# Patient Record
Sex: FEMALE | Race: BLACK OR AFRICAN AMERICAN | NOT HISPANIC OR LATINO
[De-identification: names, ages, dates, MRNs, and addresses within clinical notes are randomized per-mention and may not be internally consistent; named-entity substitution may affect disease eponyms.]

---

## 2017-06-20 ENCOUNTER — TRANSCRIPTION ENCOUNTER (OUTPATIENT)
Age: 37
End: 2017-06-20

## 2017-10-04 ENCOUNTER — RESULT REVIEW (OUTPATIENT)
Age: 37
End: 2017-10-04

## 2017-10-10 ENCOUNTER — TRANSCRIPTION ENCOUNTER (OUTPATIENT)
Age: 37
End: 2017-10-10

## 2018-01-20 ENCOUNTER — TRANSCRIPTION ENCOUNTER (OUTPATIENT)
Age: 38
End: 2018-01-20

## 2018-05-12 ENCOUNTER — TRANSCRIPTION ENCOUNTER (OUTPATIENT)
Age: 38
End: 2018-05-12

## 2018-07-31 ENCOUNTER — RESULT REVIEW (OUTPATIENT)
Age: 38
End: 2018-07-31

## 2019-03-14 ENCOUNTER — TRANSCRIPTION ENCOUNTER (OUTPATIENT)
Age: 39
End: 2019-03-14

## 2019-05-21 ENCOUNTER — RESULT REVIEW (OUTPATIENT)
Age: 39
End: 2019-05-21

## 2019-06-07 ENCOUNTER — RESULT REVIEW (OUTPATIENT)
Age: 39
End: 2019-06-07

## 2019-08-23 ENCOUNTER — TRANSCRIPTION ENCOUNTER (OUTPATIENT)
Age: 39
End: 2019-08-23

## 2019-10-08 ENCOUNTER — TRANSCRIPTION ENCOUNTER (OUTPATIENT)
Age: 39
End: 2019-10-08

## 2020-05-13 ENCOUNTER — TRANSCRIPTION ENCOUNTER (OUTPATIENT)
Age: 40
End: 2020-05-13

## 2020-06-01 ENCOUNTER — TRANSCRIPTION ENCOUNTER (OUTPATIENT)
Age: 40
End: 2020-06-01

## 2020-10-19 ENCOUNTER — TRANSCRIPTION ENCOUNTER (OUTPATIENT)
Age: 40
End: 2020-10-19

## 2021-05-21 ENCOUNTER — TRANSCRIPTION ENCOUNTER (OUTPATIENT)
Age: 41
End: 2021-05-21

## 2021-06-23 ENCOUNTER — TRANSCRIPTION ENCOUNTER (OUTPATIENT)
Age: 41
End: 2021-06-23

## 2021-09-20 ENCOUNTER — TRANSCRIPTION ENCOUNTER (OUTPATIENT)
Age: 41
End: 2021-09-20

## 2021-09-20 DIAGNOSIS — Z01.812 ENCOUNTER FOR PREPROCEDURAL LABORATORY EXAMINATION: ICD-10-CM

## 2021-09-30 ENCOUNTER — APPOINTMENT (OUTPATIENT)
Dept: PULMONOLOGY | Facility: CLINIC | Age: 41
End: 2021-09-30

## 2021-11-10 ENCOUNTER — TRANSCRIPTION ENCOUNTER (OUTPATIENT)
Age: 41
End: 2021-11-10

## 2021-11-12 ENCOUNTER — TRANSCRIPTION ENCOUNTER (OUTPATIENT)
Age: 41
End: 2021-11-12

## 2021-11-13 ENCOUNTER — TRANSCRIPTION ENCOUNTER (OUTPATIENT)
Age: 41
End: 2021-11-13

## 2022-05-04 ENCOUNTER — APPOINTMENT (OUTPATIENT)
Dept: ORTHOPEDIC SURGERY | Facility: CLINIC | Age: 42
End: 2022-05-04

## 2022-05-16 ENCOUNTER — NON-APPOINTMENT (OUTPATIENT)
Age: 42
End: 2022-05-16

## 2022-05-21 ENCOUNTER — NON-APPOINTMENT (OUTPATIENT)
Age: 42
End: 2022-05-21

## 2022-07-05 ENCOUNTER — APPOINTMENT (OUTPATIENT)
Dept: ORTHOPEDIC SURGERY | Facility: CLINIC | Age: 42
End: 2022-07-05

## 2022-08-03 ENCOUNTER — APPOINTMENT (OUTPATIENT)
Dept: ORTHOPEDIC SURGERY | Facility: CLINIC | Age: 42
End: 2022-08-03

## 2022-08-03 VITALS — BODY MASS INDEX: 26.31 KG/M2 | HEIGHT: 62 IN | WEIGHT: 143 LBS

## 2022-08-03 PROCEDURE — 99213 OFFICE O/P EST LOW 20 MIN: CPT

## 2022-08-03 PROCEDURE — 99072 ADDL SUPL MATRL&STAF TM PHE: CPT

## 2022-08-03 NOTE — ASSESSMENT
[FreeTextEntry1] : xrays of the left knee from 8/26/21\par Obtain knee\par OOW 1wk\par \par Impression: \par 1. Slight effusion, mild infrapatellar synovitis, slight prepatellar bursitis and tiny popliteal cyst.\par 2. Tiny enchondroma in the lateral femoral condyle.\par 3. No acute osseous injury, ligament tear, meniscal tear, chondral defect or loose body.\par 4. Mild distal quadriceps tendinopathy without tear.\par E-signed by Vito Monk MD 09/13/2021\par Encouraged HEP and PT to improve mechanics and reduce pain. \par OOW until 9/27/21\par 2/9/22: Recommend restarting in PT, Mobic rx. OOW x 1 week. Follow up 4 weeks.\par \par 8/3/22: Left knee and leg pain flareup with calf pain about a week ago. Send for stat doppler LLE to r/o DVT. If doppler negative, will start PT. Mobic. fu 4 weeks

## 2022-08-03 NOTE — PHYSICAL EXAM
[Left] : left knee [NL (0)] : extension 0 degrees [] : no effusion [TWNoteComboBox7] : flexion 130 degrees

## 2022-08-03 NOTE — DISCUSSION/SUMMARY
[de-identified] : The documentation recorded by the scribe accurately reflects the service I personally performed and the decisions made by me.\par I, Miguel Ángel Patton, attest that this documentation has been prepared under the direction and in the presence of Provider Jeff Osborn MD.\par \par The patient was seen by Jeff Osborn MD\par

## 2022-08-03 NOTE — HISTORY OF PRESENT ILLNESS
[8] : 8 [Full time] : Work status: full time [] : yes [de-identified] : WC DOI 8/25/21\par 2/9/22: Here for fu. Pain has returned. She has been working. \par 9/17/21: PT present to review MRI.\par 8/3/22: Left knee and lower leg pain worsening over the last month. She has been working 16 hr shifts 4 days per week which she think flared up her pain. She is using the knee brace. Meloxicam PRN  [FreeTextEntry1] : LEFT KNEE

## 2022-08-03 NOTE — WORK
[Was the competent medical cause of the injury] : was the competent medical cause of the injury [Are consistent with the injury] : are consistent with the injury [Consistent with my objective findings] : consistent with my objective findings [Total] : total [Does not reveal pre-existing condition(s) that may affect treatment/prognosis] : does not reveal pre-existing condition(s) that may affect treatment/prognosis [Cannot return to work because ________] : cannot return to work because [unfilled] [No Rx restrictions] : No Rx restrictions. [I provided the services listed above] :  I provided the services listed above. [FreeTextEntry1] : guarded

## 2022-09-07 ENCOUNTER — APPOINTMENT (OUTPATIENT)
Dept: ORTHOPEDIC SURGERY | Facility: CLINIC | Age: 42
End: 2022-09-07

## 2022-09-07 VITALS — BODY MASS INDEX: 26.31 KG/M2 | WEIGHT: 143 LBS | HEIGHT: 62 IN

## 2022-09-07 PROCEDURE — 99213 OFFICE O/P EST LOW 20 MIN: CPT

## 2022-09-07 PROCEDURE — 99072 ADDL SUPL MATRL&STAF TM PHE: CPT

## 2022-09-07 NOTE — DATA REVIEWED
[Venous Doppler] : A Venous Doppler test was completed of the [Lower extremity] : lower extremity [Negative] : negative [FreeTextEntry1] : \par Impression:  left knee\par 1. Slight effusion, mild infrapatellar synovitis, slight prepatellar bursitis and tiny popliteal cyst.\par 2. Tiny enchondroma in the lateral femoral condyle.\par 3. No acute osseous injury, ligament tear, meniscal tear, chondral defect or loose body.\par 4. Mild distal quadriceps tendinopathy without tear.\par E-signed by Vito Monk MD 09/13/2021

## 2022-09-07 NOTE — PHYSICAL EXAM
[Left] : left knee [NL (0)] : extension 0 degrees [] : patient ambulates with assistive device [de-identified] : quad 5-/5 [TWNoteComboBox7] : flexion 130 degrees

## 2022-09-07 NOTE — ASSESSMENT
[FreeTextEntry1] : outside xrays of the left knee UC from 8/26/21 reveals no fracture, or abnormalities. \par \par OOW 1wk\par \par Encouraged HEP and PT to improve mechanics and reduce pain. \par OOW until 9/27/21\par 2/9/22: Recommend restarting in PT, Mobic rx. OOW x 1 week. Follow up 4 weeks.\par \par 8/3/22: Left knee and leg pain flareup with calf pain about a week ago. Send for stat doppler LLE to r/o DVT. If doppler negative, will start PT. Mobic. fu 4 weeks\par \par 9/7/22: anticipating she will be able to return to work in 2 wks, if not she will contact me for options. \par Disability paperwork handed to office staff today. \par Apply ice to affected area.\par resume PT and HEP to improve mechanics and reduce pain. Has been beneficial for the patient. \par Questions addressed.\par \par \par

## 2022-09-07 NOTE — HISTORY OF PRESENT ILLNESS
[8] : 8 [Not working due to injury] : Work status: not working due to injury [] : yes [de-identified] :  DOI 8/25/21\par 9/7/22: f/u on the left knee and calf, had doppler that returned negative. Is attending PT. Still some difficulty with tasks. \par \par 2/9/22: Here for fu. Pain has returned. She has been working. \par \par 9/17/21: PT present to review MRI.\par \par 8/3/22: Left knee and lower leg pain worsening over the last month. She has been working 16 hr shifts 4 days per week which she think flared up her pain. She is using the knee brace. Meloxicam PRN  [FreeTextEntry1] : left knee [de-identified] : PT

## 2022-09-07 NOTE — DISCUSSION/SUMMARY
[de-identified] : The documentation recorded by the scribe accurately reflects the service I personally performed and the decisions made by me.\par I, Miguel Ángel Patton, attest that this documentation has been prepared under the direction and in the presence of Provider Jeff Osborn MD.\par \par The patient was seen by Jeff Osborn MD\par

## 2022-09-21 ENCOUNTER — APPOINTMENT (OUTPATIENT)
Dept: ORTHOPEDIC SURGERY | Facility: CLINIC | Age: 42
End: 2022-09-21

## 2022-09-21 PROCEDURE — 99213 OFFICE O/P EST LOW 20 MIN: CPT

## 2022-09-21 PROCEDURE — 99072 ADDL SUPL MATRL&STAF TM PHE: CPT

## 2022-09-21 NOTE — PHYSICAL EXAM
[Left] : left knee [NL (0)] : extension 0 degrees [] : patient ambulates with assistive device [de-identified] : quad 5-/5 [TWNoteComboBox7] : flexion 130 degrees

## 2022-09-21 NOTE — DISCUSSION/SUMMARY
[de-identified] : The documentation recorded by the scribe accurately reflects the service I personally performed and the decisions made by me.\par I, Miguel Ángel Patton, attest that this documentation has been prepared under the direction and in the presence of Provider Jeff Osborn MD.\par The patient was seen by Dr. Osborn\par

## 2022-09-21 NOTE — ASSESSMENT
[FreeTextEntry1] : outside xrays of the left knee UC from 8/26/21 reveals no fracture, or abnormalities. \par \par OOW 1wk\par \par Encouraged HEP and PT to improve mechanics and reduce pain. \par OOW until 9/27/21\par 2/9/22: Recommend restarting in PT, Mobic rx. OOW x 1 week. Follow up 4 weeks.\par \par 8/3/22: Left knee and leg pain flareup with calf pain about a week ago. Send for stat doppler LLE to r/o DVT. If doppler negative, will start PT. Mobic. fu 4 weeks\par \par 9/7/22: anticipating she will be able to return to work in 2 wks, if not she will contact me for options. \par Disability paperwork handed to office staff today. \par Apply ice to affected area.\par resume PT and HEP to improve mechanics and reduce pain. Has been beneficial for the patient. \par Questions addressed.\par \par 09/21/2022: Rtw full duty in 2 wks. resume PT and HEp as needed,\par Questions answered.\par Apply ice to affected area.\par \par \par \par  Stage 1: Number Of Blocks?: 1

## 2022-10-06 ENCOUNTER — APPOINTMENT (OUTPATIENT)
Dept: ORTHOPEDIC SURGERY | Facility: CLINIC | Age: 42
End: 2022-10-06

## 2022-10-06 VITALS — WEIGHT: 143 LBS | HEIGHT: 62 IN | BODY MASS INDEX: 26.31 KG/M2

## 2022-10-06 PROCEDURE — 99072 ADDL SUPL MATRL&STAF TM PHE: CPT

## 2022-10-06 PROCEDURE — 99214 OFFICE O/P EST MOD 30 MIN: CPT

## 2022-10-06 NOTE — ASSESSMENT
[FreeTextEntry1] : \par outside xrays of the left knee UC from 8/26/21 reveals no fracture, or abnormalities. \par \par OOW 1wk\par \par Encouraged HEP and PT to improve mechanics and reduce pain. \par OOW until 9/27/21\par 2/9/22: Recommend restarting in PT, Mobic rx. OOW x 1 week. Follow up 4 weeks.\par \par 8/3/22: Left knee and leg pain flareup with calf pain about a week ago. Send for stat doppler LLE to r/o DVT. If doppler negative, will start PT. Mobic. fu 4 weeks\par \par 9/7/22: anticipating she will be able to return to work in 2 wks, if not she will contact me for options. \par Disability paperwork handed to office staff today. \par Apply ice to affected area.\par resume PT and HEP to improve mechanics and reduce pain. Has been beneficial for the patient. \par Questions addressed.\par \par 09/21/2022: Rtw full duty in 2 wks. resume PT and HEp as needed\par \par 10/06/2022 Buckling episode of the left knee on 10/4/22. \par Work status updated. \par Questions addressed.\par Apply ice to affected area.\par Activity modifier as tolerated.\par \par

## 2022-10-06 NOTE — PHYSICAL EXAM
[Left] : left knee [Equivocal] : equivocal Karena [] : patient ambulates with assistive device [TWNoteComboBox7] : flexion 130 degrees [de-identified] : extension 3 degrees

## 2022-10-06 NOTE — HISTORY OF PRESENT ILLNESS
[7] : 7 [Dull/Aching] : dull/aching [Constant] : constant [Meds] : meds [de-identified] : 10/06/2022 Here for left knee buckling episode on 10/4/22. There can be night symptoms. Using HKB. Taking NSAIDs.  [] : Post Surgical Visit: no [FreeTextEntry1] : left knee [FreeTextEntry5] : Patient states her knee gave out on her in 10/4/22 [FreeTextEntry6] : stiffness

## 2022-10-06 NOTE — DISCUSSION/SUMMARY
[de-identified] : The documentation recorded by the scribe accurately reflects the service I personally performed and the decisions made by me.\par I, Miguel Ángel Patton, attest that this documentation has been prepared under the direction and in the presence of Provider Jeff Osborn MD.\par \par The patient was seen by Jeff Osborn MD.\par

## 2022-10-16 ENCOUNTER — FORM ENCOUNTER (OUTPATIENT)
Age: 42
End: 2022-10-16

## 2022-10-17 ENCOUNTER — APPOINTMENT (OUTPATIENT)
Dept: MRI IMAGING | Facility: CLINIC | Age: 42
End: 2022-10-17

## 2022-10-17 PROCEDURE — 99072 ADDL SUPL MATRL&STAF TM PHE: CPT

## 2022-10-17 PROCEDURE — 73721 MRI JNT OF LWR EXTRE W/O DYE: CPT | Mod: LT

## 2022-10-18 ENCOUNTER — TRANSCRIPTION ENCOUNTER (OUTPATIENT)
Age: 42
End: 2022-10-18

## 2022-10-20 ENCOUNTER — APPOINTMENT (OUTPATIENT)
Dept: ORTHOPEDIC SURGERY | Facility: CLINIC | Age: 42
End: 2022-10-20

## 2022-10-20 ENCOUNTER — NON-APPOINTMENT (OUTPATIENT)
Age: 42
End: 2022-10-20

## 2022-10-20 VITALS — BODY MASS INDEX: 26.31 KG/M2 | WEIGHT: 143 LBS | HEIGHT: 62 IN

## 2022-10-20 PROCEDURE — 99072 ADDL SUPL MATRL&STAF TM PHE: CPT

## 2022-10-20 PROCEDURE — 99213 OFFICE O/P EST LOW 20 MIN: CPT

## 2022-10-20 NOTE — DISCUSSION/SUMMARY
[de-identified] : The documentation recorded by the scribe accurately reflects the service I personally performed and the decisions made by me.\par I, Miguel Ángel Patotn, attest that this documentation has been prepared under the direction and in the presence of Provider Jeff Osborn MD.\par \par The patient was seen by Jeff Osborn MD.\par

## 2022-10-20 NOTE — PHYSICAL EXAM
[Left] : left knee [Equivocal] : equivocal Karena [] : patient ambulates with assistive device [TWNoteComboBox7] : flexion 130 degrees [de-identified] : extension 3 degrees

## 2022-10-20 NOTE — HISTORY OF PRESENT ILLNESS
[7] : 7 [Dull/Aching] : dull/aching [de-identified] : 10/06/2022 Here for left knee buckling episode on 10/4/22. There can be night symptoms. Using HKB. Taking NSAIDs.  [] : Post Surgical Visit: no [FreeTextEntry1] : left knee  [de-identified] : none

## 2022-10-20 NOTE — DATA REVIEWED
[MRI] : MRI [Left] : left [Knee] : knee [Report was reviewed and noted in the chart] : The report was reviewed and noted in the chart [I reviewed the films/CD and agree] : I reviewed the films/CD and agree [FreeTextEntry1] : Impression: left knee\par 1. No significant interval change from prior exam, which includes slight effusion, infrapatellar synovitis, slight prepatellar bursitis and tiny popliteal cyst.\par 2. Mild quadriceps tendinopathy.\par 3. Tiny enchondroma in the superior anterior peripheral lateral femoral condyle.\par 4. No acute osseous injury, meniscal tear or loose body.\par E-signed by Vito Monk MD 10/18/2022 10:15:47 AM

## 2022-10-20 NOTE — ASSESSMENT
[FreeTextEntry1] : \par outside xrays of the left knee UC from 8/26/21 reveals no fracture, or abnormalities. \par \par OOW 1wk\par \par Encouraged HEP and PT to improve mechanics and reduce pain. \par OOW until 9/27/21\par 2/9/22: Recommend restarting in PT, Mobic rx. OOW x 1 week. Follow up 4 weeks.\par \par 8/3/22: Left knee and leg pain flareup with calf pain about a week ago. Send for stat doppler LLE to r/o DVT. If doppler negative, will start PT. Mobic. fu 4 weeks\par \par 9/7/22: anticipating she will be able to return to work in 2 wks, if not she will contact me for options. \par Disability paperwork handed to office staff today. \par Apply ice to affected area.\par resume PT and HEP to improve mechanics and reduce pain. Has been beneficial for the patient. \par Questions addressed.\par \par 09/21/2022: Rtw full duty in 2 wks. resume PT and HEp as needed\par \par 10/06/2022 Buckling episode of the left knee on 10/4/22. \par Work status updated. \par \par 10/20/2022: MRI reviewed and discussed.\par OOW 2 wks. Start PT and HEP to improve mechanics and reduce pain.\par Questions addressed.\par Apply ice to affected area.\par Activity modifier as tolerated.\par \par

## 2022-11-01 ENCOUNTER — NON-APPOINTMENT (OUTPATIENT)
Age: 42
End: 2022-11-01

## 2022-11-03 ENCOUNTER — NON-APPOINTMENT (OUTPATIENT)
Age: 42
End: 2022-11-03

## 2022-11-03 ENCOUNTER — APPOINTMENT (OUTPATIENT)
Dept: ORTHOPEDIC SURGERY | Facility: CLINIC | Age: 42
End: 2022-11-03

## 2022-11-03 PROCEDURE — 99214 OFFICE O/P EST MOD 30 MIN: CPT

## 2022-11-03 PROCEDURE — 99072 ADDL SUPL MATRL&STAF TM PHE: CPT

## 2022-11-03 RX ORDER — METHYLPREDNISOLONE 4 MG/1
4 TABLET ORAL
Qty: 1 | Refills: 0 | Status: COMPLETED | COMMUNITY
Start: 2022-11-03 | End: 2022-11-09

## 2022-11-03 NOTE — PHYSICAL EXAM
[Left] : left knee [Equivocal] : equivocal Karena [] : patient ambulates with assistive device [TWNoteComboBox7] : flexion 130 degrees [de-identified] : extension 3 degrees

## 2022-11-03 NOTE — HISTORY OF PRESENT ILLNESS
[7] : 7 [Dull/Aching] : dull/aching [de-identified] :  DOI 8/25/21\par 9/21/22: f/u left knee. \par \par 9/7/22: f/u on the left knee and calf, had doppler that returned negative. Is attending PT. Still some difficulty with tasks. \par \par 8/3/22: Left knee and lower leg pain worsening over the last month. She has been working 16 hr shifts 4 days per week which she think flared up her pain. She is using the knee brace. Meloxicam PRN \par \par 2/9/22: Here for fu. Pain has returned. She has been working. \par \par 9/17/21: PT present to review MRI.\par \par  [] : Post Surgical Visit: no [FreeTextEntry1] : left knee  [de-identified] : PT

## 2022-11-03 NOTE — WORK
[Was the competent medical cause of the injury] : was the competent medical cause of the injury [Are consistent with the injury] : are consistent with the injury [Consistent with my objective findings] : consistent with my objective findings [Total] : total [Does not reveal pre-existing condition(s) that may affect treatment/prognosis] : does not reveal pre-existing condition(s) that may affect treatment/prognosis [Cannot return to work because ________] : cannot return to work because [unfilled] [No Rx restrictions] : No Rx restrictions. [I provided the services listed above] :  I provided the services listed above. [FreeTextEntry1] : guarded OOW until 12/1/22

## 2022-11-03 NOTE — ASSESSMENT
[FreeTextEntry1] : outside xrays of the left knee UC from 8/26/21 reveals no fracture, or abnormalities. \par \par OOW 1wk\par \par Encouraged HEP and PT to improve mechanics and reduce pain. \par OOW until 9/27/21\par 2/9/22: Recommend restarting in PT, Mobic rx. OOW x 1 week. Follow up 4 weeks.\par \par 8/3/22: Left knee and leg pain flareup with calf pain about a week ago. Send for stat doppler LLE to r/o DVT. If doppler negative, will start PT. Mobic. fu 4 weeks\par \par 9/7/22: anticipating she will be able to return to work in 2 wks, if not she will contact me for options. \par Disability paperwork handed to office staff today. \par Apply ice to affected area.\par resume PT and HEP to improve mechanics and reduce pain. Has been beneficial for the patient. \par Questions addressed.\par \par 09/21/2022: Rtw full duty in 2 wks. resume PT and HEp as needed,\par Questions answered.\par Apply ice to affected area.\par \par 11/03/2022: Prescribed mdp to help alveated pain.  confrims medial tenderness. \par resume pt Renewed. \par OOW until 12/1/22\par Questions addressed.\par Activity modifier as tolerated.\par \par \par \par

## 2022-11-03 NOTE — DISCUSSION/SUMMARY
[de-identified] : The documentation recorded by the scribe accurately reflects the service I personally performed and the decisions made by me.\par I, Miguel Ángel Patton, attest that this documentation has been prepared under the direction and in the presence of Provider Jeff Osborn MD.\par \par The patient was seen by Jeff Osborn MD.\par The following radiographs were ordered and read by me during this patient's visit. I reviewed each radiograph in detail with the patient, and discussed the findings as highlighted below.\par

## 2022-12-01 ENCOUNTER — APPOINTMENT (OUTPATIENT)
Dept: ORTHOPEDIC SURGERY | Facility: CLINIC | Age: 42
End: 2022-12-01

## 2022-12-01 VITALS — WEIGHT: 143 LBS | HEIGHT: 62 IN | BODY MASS INDEX: 26.31 KG/M2

## 2022-12-01 PROCEDURE — 20610 DRAIN/INJ JOINT/BURSA W/O US: CPT | Mod: LT

## 2022-12-01 PROCEDURE — 99213 OFFICE O/P EST LOW 20 MIN: CPT | Mod: 25

## 2022-12-01 PROCEDURE — 99072 ADDL SUPL MATRL&STAF TM PHE: CPT

## 2022-12-01 NOTE — ASSESSMENT
[FreeTextEntry1] : outside xrays of the left knee UC from 8/26/21 reveals no fracture, or abnormalities. \par \par OOW 1wk\par \par Encouraged HEP and PT to improve mechanics and reduce pain. \par OOW until 9/27/21\par 2/9/22: Recommend restarting in PT, Mobic rx. OOW x 1 week. Follow up 4 weeks.\par \par 8/3/22: Left knee and leg pain flareup with calf pain about a week ago. Send for stat doppler LLE to r/o DVT. If doppler negative, will start PT. Mobic. fu 4 weeks\par \par 9/7/22: anticipating she will be able to return to work in 2 wks, if not she will contact me for options. \par Disability paperwork handed to office staff today. \par Apply ice to affected area.\par resume PT and HEP to improve mechanics and reduce pain. Has been beneficial for the patient. \par Questions addressed.\par \par 09/21/2022: Rtw full duty in 2 wks. resume PT and HEp as needed,\par Questions answered.\par Apply ice to affected area.\par \par 11/03/2022: Prescribed mdp to help alveated pain.  confrims medial tenderness. \par resume pt Renewed. \par OOW until 12/1/22\par \par 12/1/22: CSI offered today - tolerated well. \par PT renewed\par OOW until 12/15/22\par Questions addressed.\par Activity modifier as tolerated.\par \par \par \par

## 2022-12-01 NOTE — REASON FOR VISIT
[FreeTextEntry2] : Patient is here for a Worker's Comp Follow Up appointment for the Left Knee. DOI: 8/25/21

## 2022-12-01 NOTE — PHYSICAL EXAM
[Left] : left knee [4___] : hamstring 4[unfilled]/5 [Equivocal] : equivocal Karena [] : patient ambulates with assistive device [TWNoteComboBox7] : flexion 130 degrees [de-identified] : extension 0 degrees

## 2022-12-01 NOTE — WORK
[Was the competent medical cause of the injury] : was the competent medical cause of the injury [Are consistent with the injury] : are consistent with the injury [Consistent with my objective findings] : consistent with my objective findings [Total] : total [Does not reveal pre-existing condition(s) that may affect treatment/prognosis] : does not reveal pre-existing condition(s) that may affect treatment/prognosis [Cannot return to work because ________] : cannot return to work because [unfilled] [No Rx restrictions] : No Rx restrictions. [I provided the services listed above] :  I provided the services listed above. [FreeTextEntry1] : guarded OOW until 12/15/22

## 2022-12-01 NOTE — HISTORY OF PRESENT ILLNESS
[7] : 7 [6] : 6 [de-identified] :  DOI 8/25/21\par \par 12/01/2022: Here for follow up LEft knee. Notes only slight improvement. Cont Brace, NSAIDs and PT. Continues to c/o weakness. Remains OOW.\par \par 9/21/22: f/u left knee. \par \par 9/7/22: f/u on the left knee and calf, had doppler that returned negative. Is attending PT. Still some difficulty with tasks. \par \par 8/3/22: Left knee and lower leg pain worsening over the last month. She has been working 16 hr shifts 4 days per week which she think flared up her pain. She is using the knee brace. Meloxicam PRN \par \par 2/9/22: Here for fu. Pain has returned. She has been working. \par \par 9/17/21: PT present to review MRI.\par \par  [FreeTextEntry1] : left knee

## 2022-12-01 NOTE — DISCUSSION/SUMMARY
[de-identified] : The documentation recorded by the scribe accurately reflects the service I personally performed and the decisions made by me.\par I, Miguel Ángel Patton, attest that this documentation has been prepared under the direction and in the presence of Provider Jeff Osborn MD.\par \par The patient was seen by Jeff Osborn MD.\par The following radiographs were ordered and read by me during this patient's visit. I reviewed each radiograph in detail with the patient, and discussed the findings as highlighted below.\par

## 2022-12-14 ENCOUNTER — APPOINTMENT (OUTPATIENT)
Dept: ORTHOPEDIC SURGERY | Facility: CLINIC | Age: 42
End: 2022-12-14

## 2022-12-14 VITALS — HEIGHT: 62 IN | WEIGHT: 143 LBS | BODY MASS INDEX: 26.31 KG/M2

## 2022-12-14 PROCEDURE — 99072 ADDL SUPL MATRL&STAF TM PHE: CPT

## 2022-12-14 PROCEDURE — 99213 OFFICE O/P EST LOW 20 MIN: CPT

## 2022-12-14 NOTE — HISTORY OF PRESENT ILLNESS
[7] : 7 [6] : 6 [Dull/Aching] : dull/aching [Localized] : localized [Constant] : constant [Sleep] : sleep [Meds] : meds [Physical therapy] : physical therapy [Injection therapy] : injection therapy [Standing] : standing [Walking] : walking [Not working due to injury] : Work status: not working due to injury [de-identified] :  DOI 8/25/21\par \par \par 12-14-22- Continues out of work and in the hinged knee brace. No help from the csi 2 weeks ago. states difficulty with flexion and ambulation\par \par \par 12/01/2022: Here for follow up LEft knee. Notes only slight improvement. Cont Brace, NSAIDs and PT. Continues to c/o weakness. Remains OOW.\par \par 9/21/22: f/u left knee. \par \par 9/7/22: f/u on the left knee and calf, had doppler that returned negative. Is attending PT. Still some difficulty with tasks. \par \par 8/3/22: Left knee and lower leg pain worsening over the last month. She has been working 16 hr shifts 4 days per week which she think flared up her pain. She is using the knee brace. Meloxicam PRN \par \par 2/9/22: Here for fu. Pain has returned. She has been working. \par \par 9/17/21: PT present to review MRI.\par \par  [] : no [FreeTextEntry1] : left knee [FreeTextEntry5] : 12/14/2022 Ms. OVIDIO VICTOR F  here for eval of the left knee. Patient stated they feel the same since their last visit.\par Patient states she received  cortisone shot but it did not help to elevate her pain.\par  [FreeTextEntry9] : cortisone inj [de-identified] : long periods

## 2022-12-14 NOTE — WORK
[Was the competent medical cause of the injury] : was the competent medical cause of the injury [Are consistent with the injury] : are consistent with the injury [Consistent with my objective findings] : consistent with my objective findings [Total] : total [Does not reveal pre-existing condition(s) that may affect treatment/prognosis] : does not reveal pre-existing condition(s) that may affect treatment/prognosis [Cannot return to work because ________] : cannot return to work because [unfilled] [No Rx restrictions] : No Rx restrictions. [I provided the services listed above] :  I provided the services listed above. [FreeTextEntry1] : guarded OOW re evaluate in 6 weeks

## 2022-12-14 NOTE — PHYSICAL EXAM
[Left] : left knee [4___] : hamstring 4[unfilled]/5 [Equivocal] : equivocal Karena [] : patient ambulates with assistive device [TWNoteComboBox7] : flexion 130 degrees [de-identified] : extension 0 degrees

## 2022-12-14 NOTE — DATA REVIEWED
[FreeTextEntry1] : \par Impression:  left knee\par 1. Slight effusion, mild infrapatellar synovitis, slight prepatellar bursitis and tiny popliteal cyst.\par 2. Tiny enchondroma in the lateral femoral condyle.\par 3. No acute osseous injury, ligament tear, meniscal tear, chondral defect or loose body.\par 4. Mild distal quadriceps tendinopathy without tear.\par E-signed by Vito Monk MD 09/13/2021

## 2022-12-14 NOTE — ASSESSMENT
[FreeTextEntry1] : outside xrays of the left knee UC from 8/26/21 reveals no fracture, or abnormalities. \par \par OOW 1wk\par \par Encouraged HEP and PT to improve mechanics and reduce pain. \par OOW until 9/27/21\par 2/9/22: Recommend restarting in PT, Mobic rx. OOW x 1 week. Follow up 4 weeks.\par \par 8/3/22: Left knee and leg pain flareup with calf pain about a week ago. Send for stat doppler LLE to r/o DVT. If doppler negative, will start PT. Mobic. fu 4 weeks\par \par 9/7/22: anticipating she will be able to return to work in 2 wks, if not she will contact me for options. \par Disability paperwork handed to office staff today. \par Apply ice to affected area.\par resume PT and HEP to improve mechanics and reduce pain. Has been beneficial for the patient. \par Questions addressed.\par \par 09/21/2022: Rtw full duty in 2 wks. resume PT and HEp as needed,\par Questions answered.\par Apply ice to affected area.\par \par 11/03/2022: Prescribed mdp to help alveated pain.  confrims medial tenderness. \par resume pt Renewed. \par OOW until 12/1/22\par \par 12/1/22: CSI offered today - tolerated well. \par PT renewed\par OOW until 12/15/22\par Questions addressed.\par Activity modifier as tolerated.\par \par 12-14-22-\par pt renewed mg 2 and variance for that \par continue out of work ambulate in the hkb\par due to no responsive treatment without structural damage on the mri advise rheumatology consult\par f/u 6 weeks\par \par \par

## 2023-01-25 ENCOUNTER — APPOINTMENT (OUTPATIENT)
Dept: ORTHOPEDIC SURGERY | Facility: CLINIC | Age: 43
End: 2023-01-25
Payer: OTHER MISCELLANEOUS

## 2023-01-25 VITALS — BODY MASS INDEX: 26.31 KG/M2 | WEIGHT: 143 LBS | HEIGHT: 62 IN

## 2023-01-25 PROCEDURE — 99072 ADDL SUPL MATRL&STAF TM PHE: CPT

## 2023-01-25 PROCEDURE — 99213 OFFICE O/P EST LOW 20 MIN: CPT

## 2023-01-25 RX ORDER — MELOXICAM 15 MG/1
15 TABLET ORAL
Qty: 30 | Refills: 0 | Status: ACTIVE | COMMUNITY
Start: 2022-08-03 | End: 1900-01-01

## 2023-01-25 NOTE — HISTORY OF PRESENT ILLNESS
[Work related] : work related [5] : 5 [Dull/Aching] : dull/aching [Localized] : localized [Constant] : constant [Sleep] : sleep [Meds] : meds [Physical therapy] : physical therapy [Injection therapy] : injection therapy [Standing] : standing [Walking] : walking [Not working due to injury] : Work status: not working due to injury [de-identified] :  DOI 8/25/21\par \par 1/25/23: Left knee continues, but does not some improvement with PT. Wearing HKB. Has rheum apt for next month.\par \par 12-14-22- Continues out of work and in the hinged knee brace. No help from the csi 2 weeks ago. states difficulty with flexion and ambulation\par \par \par 12/01/2022: Here for follow up LEft knee. Notes only slight improvement. Cont Brace, NSAIDs and PT. Continues to c/o weakness. Remains OOW.\par \par 9/21/22: f/u left knee. \par \par 9/7/22: f/u on the left knee and calf, had doppler that returned negative. Is attending PT. Still some difficulty with tasks. \par \par 8/3/22: Left knee and lower leg pain worsening over the last month. She has been working 16 hr shifts 4 days per week which she think flared up her pain. She is using the knee brace. Meloxicam PRN \par \par 2/9/22: Here for fu. Pain has returned. She has been working. \par \par 9/17/21: PT present to review MRI.\par \par  [] : no [FreeTextEntry1] : left knee [FreeTextEntry3] : 8/25/21 [FreeTextEntry5] : Pt is here for follow up of LT knee. Pain has improved since the last visit.  [FreeTextEntry9] : cortisone inj, brace [de-identified] : long periods [de-identified] : brace

## 2023-01-25 NOTE — DISCUSSION/SUMMARY
[de-identified] : Progress note completed by Desiree Gomez PA-C under the direct supervision of Jeff Osborn MD.\par

## 2023-01-25 NOTE — PHYSICAL EXAM
[Left] : left knee [4___] : hamstring 4[unfilled]/5 [Equivocal] : equivocal Karena [] : patient ambulates with assistive device [TWNoteComboBox7] : flexion 130 degrees [de-identified] : extension 0 degrees

## 2023-01-25 NOTE — ASSESSMENT
[FreeTextEntry1] : outside xrays of the left knee UC from 8/26/21 reveals no fracture, or abnormalities. \par \par OOW 1wk\par \par Encouraged HEP and PT to improve mechanics and reduce pain. \par OOW until 9/27/21\par 2/9/22: Recommend restarting in PT, Mobic rx. OOW x 1 week. Follow up 4 weeks.\par \par 8/3/22: Left knee and leg pain flareup with calf pain about a week ago. Send for stat doppler LLE to r/o DVT. If doppler negative, will start PT. Mobic. fu 4 weeks\par \par 9/7/22: anticipating she will be able to return to work in 2 wks, if not she will contact me for options. \par Disability paperwork handed to office staff today. \par Apply ice to affected area.\par resume PT and HEP to improve mechanics and reduce pain. Has been beneficial for the patient. \par Questions addressed.\par \par 09/21/2022: Rtw full duty in 2 wks. resume PT and HEp as needed,\par Questions answered.\par Apply ice to affected area.\par \par 11/03/2022: Prescribed mdp to help alveated pain.  confrims medial tenderness. \par resume pt Renewed. \par OOW until 12/1/22\par \par 12/1/22: CSI offered today - tolerated well. \par PT renewed\par OOW until 12/15/22\par Questions addressed.\par Activity modifier as tolerated.\par \par 12-14-22-\par pt renewed mg 2 and variance for that \par continue out of work ambulate in the [facility]\par due to no responsive treatment without structural damage on the mri advise rheumatology consult\par f/u 6 weeks\par \par 1/25/23: Treatment options discussed.\par Will continue PT.\par Rx for Mobic. \par OOW with [facility] brace.\par Followup 4-6 weeks after rheum consult.\par

## 2023-03-08 ENCOUNTER — NON-APPOINTMENT (OUTPATIENT)
Age: 43
End: 2023-03-08

## 2023-03-08 ENCOUNTER — APPOINTMENT (OUTPATIENT)
Dept: ORTHOPEDIC SURGERY | Facility: CLINIC | Age: 43
End: 2023-03-08
Payer: OTHER MISCELLANEOUS

## 2023-03-08 PROCEDURE — 99213 OFFICE O/P EST LOW 20 MIN: CPT

## 2023-03-08 PROCEDURE — 99072 ADDL SUPL MATRL&STAF TM PHE: CPT

## 2023-03-08 NOTE — PHYSICAL EXAM
[Left] : left knee [4___] : hamstring 4[unfilled]/5 [Equivocal] : equivocal Karena [] : patient ambulates with assistive device [TWNoteComboBox7] : flexion 130 degrees [de-identified] : extension 0 degrees

## 2023-03-08 NOTE — HISTORY OF PRESENT ILLNESS
[Work related] : work related [5] : 5 [Dull/Aching] : dull/aching [Localized] : localized [Constant] : constant [Sleep] : sleep [Meds] : meds [Physical therapy] : physical therapy [Injection therapy] : injection therapy [Standing] : standing [Walking] : walking [Not working due to injury] : Work status: not working due to injury [de-identified] : WC DOI 8/25/21\par \par 3/8/23: Follow up left knee. Symptoms continue. Some relief with PT. Notes intermittent swelling. Rheum apt was changed due to them not accepting WC.\par \par 1/25/23: Left knee continues, but does not some improvement with PT. Wearing HKB. Has rheum apt for next month.\par \par 12-14-22- Continues out of work and in the hinged knee brace. No help from the csi 2 weeks ago. states difficulty with flexion and ambulation\par \par \par 12/01/2022: Here for follow up LEft knee. Notes only slight improvement. Cont Brace, NSAIDs and PT. Continues to c/o weakness. Remains OOW.\par \par 9/21/22: f/u left knee. \par \par 9/7/22: f/u on the left knee and calf, had doppler that returned negative. Is attending PT. Still some difficulty with tasks. \par \par 8/3/22: Left knee and lower leg pain worsening over the last month. She has been working 16 hr shifts 4 days per week which she think flared up her pain. She is using the knee brace. Meloxicam PRN \par \par 2/9/22: Here for fu. Pain has returned. She has been working. \par \par 9/17/21: PT present to review MRI.\par \par  [] : no [FreeTextEntry1] : left knee [FreeTextEntry3] : 8/25/21 [FreeTextEntry5] : Pt is here for follow up of LT knee. Pain is the same since the last visit. Wearing HKB. Pt is going to PT 2x weekly with some relief.  [FreeTextEntry9] : cortisone inj, brace [de-identified] : long periods [de-identified] : Brace, Meloxicam, PT

## 2023-03-08 NOTE — ASSESSMENT
[FreeTextEntry1] : outside xrays of the left knee UC from 8/26/21 reveals no fracture, or abnormalities. \par \par OOW 1wk\par \par Encouraged HEP and PT to improve mechanics and reduce pain. \par OOW until 9/27/21\par 2/9/22: Recommend restarting in PT, Mobic rx. OOW x 1 week. Follow up 4 weeks.\par \par 8/3/22: Left knee and leg pain flareup with calf pain about a week ago. Send for stat doppler LLE to r/o DVT. If doppler negative, will start PT. Mobic. fu 4 weeks\par \par 9/7/22: anticipating she will be able to return to work in 2 wks, if not she will contact me for options. \par Disability paperwork handed to office staff today. \par Apply ice to affected area.\par resume PT and HEP to improve mechanics and reduce pain. Has been beneficial for the patient. \par Questions addressed.\par \par 09/21/2022: Rtw full duty in 2 wks. resume PT and HEp as needed,\par Questions answered.\par Apply ice to affected area.\par \par 11/03/2022: Prescribed mdp to help alveated pain.  confrims medial tenderness. \par resume pt Renewed. \par OOW until 12/1/22\par \par 12/1/22: CSI offered today - tolerated well. \par PT renewed\par OOW until 12/15/22\par Questions addressed.\par Activity modifier as tolerated.\par \par 12-14-22-\par pt renewed mg 2 and variance for that \par continue out of work ambulate in the [facility]\par due to no responsive treatment without structural damage on the mri advise rheumatology consult\par f/u 6 weeks\par \par 1/25/23: Treatment options discussed.\par Will continue PT.\par Rx for Mobic. \par OOW with [facility] brace.\par Followup 4-6 weeks after rheum consult.\par \par 3/8/23:  Continue PT. \par Will reschedule rheum consult. \par OOW with H[facility].\par Return in 4-6 weeks.

## 2023-03-08 NOTE — DISCUSSION/SUMMARY
[de-identified] : Progress note completed by Desiree Gomez PA-C under the direct supervision of Jeff Osborn MD.\par

## 2023-03-31 ENCOUNTER — NON-APPOINTMENT (OUTPATIENT)
Age: 43
End: 2023-03-31

## 2023-04-12 ENCOUNTER — NON-APPOINTMENT (OUTPATIENT)
Age: 43
End: 2023-04-12

## 2023-04-19 ENCOUNTER — APPOINTMENT (OUTPATIENT)
Dept: ORTHOPEDIC SURGERY | Facility: CLINIC | Age: 43
End: 2023-04-19
Payer: OTHER MISCELLANEOUS

## 2023-04-19 VITALS — WEIGHT: 170 LBS | BODY MASS INDEX: 31.28 KG/M2 | HEIGHT: 62 IN

## 2023-04-19 PROCEDURE — 99214 OFFICE O/P EST MOD 30 MIN: CPT

## 2023-04-19 NOTE — HISTORY OF PRESENT ILLNESS
[Work related] : work related [4] : 4 [Dull/Aching] : dull/aching [Localized] : localized [Constant] : constant [Sleep] : sleep [Meds] : meds [Physical therapy] : physical therapy [Injection therapy] : injection therapy [Standing] : standing [Walking] : walking [Not working due to injury] : Work status: not working due to injury [de-identified] : WC DOI 8/25/21\par \par 4/19/23 Patient presents in follow up, reporting pain scale 4, continues to have difficulty with bending.. Currently using bracing to left knee. Patient continues to report improvement with continued PT. Patient has consultation with Rheumatology next month. \par \par 3/8/23: Follow up left knee. Symptoms continue. Some relief with PT. Notes intermittent swelling. Rheum apt was changed due to them not accepting WC.\par \par 1/25/23: Left knee continues, but does not some improvement with PT. Wearing HKB. Has rheum apt for next month.\par \par 12-14-22- Continues out of work and in the hinged knee brace. No help from the csi 2 weeks ago. states difficulty with flexion and ambulation\par \par \par 12/01/2022: Here for follow up LEft knee. Notes only slight improvement. Cont Brace, NSAIDs and PT. Continues to c/o weakness. Remains OOW.\par \par 9/21/22: f/u left knee. \par \par 9/7/22: f/u on the left knee and calf, had doppler that returned negative. Is attending PT. Still some difficulty with tasks. \par \par 8/3/22: Left knee and lower leg pain worsening over the last month. She has been working 16 hr shifts 4 days per week which she think flared up her pain. She is using the knee brace. Meloxicam PRN \par \par 2/9/22: Here for fu. Pain has returned. She has been working. \par \par 9/17/21: PT present to review MRI.\par \par  [] : no [FreeTextEntry1] : left knee [FreeTextEntry3] : 8/25/21 [FreeTextEntry5] : Pt is here for follow up of LT knee. Pain has improved since the last visit. Going to PT 2x weekly. Using HKB.  [FreeTextEntry9] : cortisone inj, brace [de-identified] : long periods [de-identified] : Brace, Meloxicam, PT

## 2023-05-31 ENCOUNTER — APPOINTMENT (OUTPATIENT)
Dept: ORTHOPEDIC SURGERY | Facility: CLINIC | Age: 43
End: 2023-05-31
Payer: OTHER MISCELLANEOUS

## 2023-05-31 VITALS — BODY MASS INDEX: 31.28 KG/M2 | WEIGHT: 170 LBS | HEIGHT: 62 IN

## 2023-05-31 PROCEDURE — 99214 OFFICE O/P EST MOD 30 MIN: CPT

## 2023-05-31 NOTE — PHYSICAL EXAM
[NL (140)] : flexion 140 degrees [NL (0)] : extension 0 degrees [5___] : hamstring 5[unfilled]/5 [] : patient ambulates with assistive device [de-identified] : ARELIS

## 2023-05-31 NOTE — ASSESSMENT
[FreeTextEntry1] : outside xrays of the left knee UC from 8/26/21 reveals no fracture, or abnormalities. \par \par OOW 1wk\par \par Encouraged HEP and PT to improve mechanics and reduce pain. \par OOW until 9/27/21\par 2/9/22: Recommend restarting in PT, Mobic rx. OOW x 1 week. Follow up 4 weeks.\par \par 8/3/22: Left knee and leg pain flareup with calf pain about a week ago. Send for stat doppler LLE to r/o DVT. If doppler negative, will start PT. Mobic. fu 4 weeks\par \par 9/7/22: anticipating she will be able to return to work in 2 wks, if not she will contact me for options. \par Disability paperwork handed to office staff today. \par Apply ice to affected area.\par resume PT and HEP to improve mechanics and reduce pain. Has been beneficial for the patient. \par Questions addressed.\par \par 09/21/2022: Rtw full duty in 2 wks. resume PT and HEp as needed,\par Questions answered.\par Apply ice to affected area.\par \par 11/03/2022: Prescribed mdp to help alveated pain. confrims medial tenderness. \par resume pt Renewed. \par OOW until 12/1/22\par \par 12/1/22: CSI offered today - tolerated well. \par PT renewed\par OOW until 12/15/22\par Questions addressed.\par Activity modifier as tolerated.\par \par 12-14-22-\par pt renewed mg 2 and variance for that \par continue out of work ambulate in the [facility]kb\par due to no responsive treatment without structural damage on the mri advise rheumatology consult\par f/u 6 weeks\par \par 1/25/23: Treatment options discussed.\par Will continue PT.\par Rx for Mobic. \par OOW with [facility] brace.\par Followup 4-6 weeks after rheum consult.\par \par 3/8/23: Continue PT. \par Will reschedule rheum consult. \par OOW with HKB.\par Return in 4-6 weeks. \par \par 4/19/23 42 year old female presents today for follow up.\par  Plan to continue Physical therapy, bracing. \par Will have patient follow up in 4-6 weeks after Rheum evaluation. \par \par 05/31/2023 pending blood work with rhematologist. \par will remain OOW work. RTO 4 wks or soon following result from rhematologist. \par Intends to drop off disability PPwork in the future. \par Questions addressed. Activity modifier as tolerated. \par Light duty is not an option aval to her. \par \par \par The documentation recorded by the scribe accurately reflects the service I personally performed and the decisions made by me.\par I, Vance Casillas Scribe, attest that this documentation has been prepared under the direction and in the presence of Provider Jeff Osborn MD.\par \par The patient was seen by Jeff Osborn MD.\par

## 2023-05-31 NOTE — REASON FOR VISIT
[FreeTextEntry2] : Patient is here for a Worker's Comp Follow Up appointment for the Left Knee. DOI: 8/25/21\par in [facility] , saw Rheumatology  Dr Micah Vidal [phone]

## 2023-05-31 NOTE — HISTORY OF PRESENT ILLNESS
[Work related] : work related [6] : 6 [Dull/Aching] : dull/aching [Localized] : localized [Constant] : constant [Sleep] : sleep [Meds] : meds [Physical therapy] : physical therapy [Injection therapy] : injection therapy [Standing] : standing [Walking] : walking [Not working due to injury] : Work status: not working due to injury [de-identified] : WC DOI 8/25/21\par \par 4/19/23 Patient presents in follow up, reporting pain scale 4, continues to have difficulty with bending.. Currently using bracing to left knee. Patient continues to report improvement with continued PT. Patient has consultation with Rheumatology next month. \par \par 3/8/23: Follow up left knee. Symptoms continue. Some relief with PT. Notes intermittent swelling. Rheum apt was changed due to them not accepting WC.\par \par 1/25/23: Left knee continues, but does not some improvement with PT. Wearing HKB. Has rheum apt for next month.\par \par 12-14-22- Continues out of work and in the hinged knee brace. No help from the csi 2 weeks ago. states difficulty with flexion and ambulation\par \par \par 12/01/2022: Here for follow up LEft knee. Notes only slight improvement. Cont Brace, NSAIDs and PT. Continues to c/o weakness. Remains OOW.\par \par 9/21/22: f/u left knee. \par \par 9/7/22: f/u on the left knee and calf, had doppler that returned negative. Is attending PT. Still some difficulty with tasks. \par \par 8/3/22: Left knee and lower leg pain worsening over the last month. She has been working 16 hr shifts 4 days per week which she think flared up her pain. She is using the knee brace. Meloxicam PRN \par \par 2/9/22: Here for fu. Pain has returned. She has been working. \par \par 9/17/21: PT present to review MRI.\par \par  [] : no [FreeTextEntry1] : left knee [FreeTextEntry5] : Pt is here for follow up of LT knee. Pain has improved since the last visit. Going to PT 2x weekly. Using HKB.  [FreeTextEntry3] : 8/25/21 [FreeTextEntry9] : cortisone inj, brace [de-identified] : long periods [de-identified] : Brace, Meloxicam, PT

## 2023-06-15 ENCOUNTER — NON-APPOINTMENT (OUTPATIENT)
Age: 43
End: 2023-06-15

## 2023-07-05 ENCOUNTER — APPOINTMENT (OUTPATIENT)
Dept: ORTHOPEDIC SURGERY | Facility: CLINIC | Age: 43
End: 2023-07-05
Payer: OTHER MISCELLANEOUS

## 2023-07-05 VITALS — BODY MASS INDEX: 30.36 KG/M2 | WEIGHT: 165 LBS | HEIGHT: 62 IN

## 2023-07-05 PROCEDURE — 99213 OFFICE O/P EST LOW 20 MIN: CPT

## 2023-07-06 NOTE — ASSESSMENT
[FreeTextEntry1] : outside xrays of the left knee UC from 8/26/21 reveals no fracture, or abnormalities. \par \par OOW 1wk\par \par Encouraged HEP and PT to improve mechanics and reduce pain. \par OOW until 9/27/21\par 2/9/22: Recommend restarting in PT, Mobic rx. OOW x 1 week. Follow up 4 weeks.\par \par 8/3/22: Left knee and leg pain flareup with calf pain about a week ago. Send for stat doppler LLE to r/o DVT. If doppler negative, will start PT. Mobic. fu 4 weeks\par \par 9/7/22: anticipating she will be able to return to work in 2 wks, if not she will contact me for options. \par Disability paperwork handed to office staff today. \par Apply ice to affected area.\par resume PT and HEP to improve mechanics and reduce pain. Has been beneficial for the patient. \par Questions addressed.\par \par 09/21/2022: Rtw full duty in 2 wks. resume PT and HEp as needed,\par Questions answered.\par Apply ice to affected area.\par \par 11/03/2022: Prescribed mdp to help alveated pain. confrims medial tenderness. \par resume pt Renewed. \par OOW until 12/1/22\par \par 12/1/22: CSI offered today - tolerated well. \par PT renewed\par OOW until 12/15/22\par Questions addressed.\par Activity modifier as tolerated.\par \par 12-14-22-\par pt renewed mg 2 and variance for that \par continue out of work ambulate in the [facility]\par due to no responsive treatment without structural damage on the mri advise rheumatology consult\par f/u 6 weeks\par \par 1/25/23: Treatment options discussed.\par Will continue PT.\par Rx for Mobic. \par OOW with [facility] brace.\par Followup 4-6 weeks after rheum consult.\par \par 3/8/23: Continue PT. \par Will reschedule rheum consult. \par OOW with [facility].\par Return in 4-6 weeks. \par \par 4/19/23 42 year old female presents today for follow up.\par  Plan to continue Physical therapy, bracing. \par Will have patient follow up in 4-6 weeks after Rheum evaluation. \par \par 05/31/2023 pending blood work with rhematologist. \par will remain OOW work. RTO 4 wks or soon following result from rhematologist. \par Intends to drop off disability PPwork in the future. \par Questions addressed. Activity modifier as tolerated. \par Light duty is not an option aval to her. \par \par 07/05/23:\par Continue PT. \par Rheumatologist recommended an arthroscopy. I do not recommend going in due to lack of structural findings on MRI. She will get a second opinion.  \par Questions addressed. Activity modifier as tolerated. \par \par The documentation recorded by the scribe accurately reflects the service I personally performed and the decisions made by me.\par I, Jamey Pattonibe, attest that this documentation has been prepared under the direction and in the presence of Provider Jeff Osborn MD.\par \par The patient was seen by Jeff Osborn MD.\par

## 2023-07-06 NOTE — PHYSICAL EXAM
[NL (140)] : flexion 140 degrees [NL (0)] : extension 0 degrees [5___] : hamstring 5[unfilled]/5 [] : patient ambulates with assistive device [de-identified] : ARELIS

## 2023-07-06 NOTE — HISTORY OF PRESENT ILLNESS
[Work related] : work related [6] : 6 [Dull/Aching] : dull/aching [Localized] : localized [Constant] : constant [Sleep] : sleep [Meds] : meds [Physical therapy] : physical therapy [Injection therapy] : injection therapy [Standing] : standing [Walking] : walking [Not working due to injury] : Work status: not working due to injury [de-identified] : WC DOI 8/25/21\par \par 7/5/223: Follow up left knee pain. Patient states condition is similar to last visit. She saw rheum, who recommended arthroscopy.\par  \par 4/19/23 Patient presents in follow up, reporting pain scale 4, continues to have difficulty with bending.. Currently using bracing to left knee. Patient continues to report improvement with continued PT. Patient has consultation with Rheumatology next month. \par \par 3/8/23: Follow up left knee. Symptoms continue. Some relief with PT. Notes intermittent swelling. Rheum apt was changed due to them not accepting WC.\par \par 1/25/23: Left knee continues, but does not some improvement with PT. Wearing HKB. Has rheum apt for next month.\par \par 12-14-22- Continues out of work and in the hinged knee brace. No help from the csi 2 weeks ago. states difficulty with flexion and ambulation\par \par \par 12/01/2022: Here for follow up LEft knee. Notes only slight improvement. Cont Brace, NSAIDs and PT. Continues to c/o weakness. Remains OOW.\par \par 9/21/22: f/u left knee. \par \par 9/7/22: f/u on the left knee and calf, had doppler that returned negative. Is attending PT. Still some difficulty with tasks. \par \par 8/3/22: Left knee and lower leg pain worsening over the last month. She has been working 16 hr shifts 4 days per week which she think flared up her pain. She is using the knee brace. Meloxicam PRN \par \par 2/9/22: Here for fu. Pain has returned. She has been working. \par \par 9/17/21: PT present to review MRI.\par \par  [] : no [FreeTextEntry1] : left knee [FreeTextEntry3] : 8/25/21 [FreeTextEntry5] : Pt is here for follow up of LT knee. Pt states condition is similar to last visit. Using [facility]. Got her bloodwork results from Rheum. [FreeTextEntry9] : cortisone inj, brace [de-identified] : long periods

## 2023-07-06 NOTE — DATA REVIEWED
[FreeTextEntry1] : Impression: left knee\par 1. No significant interval change from prior exam, which includes slight effusion, infrapatellar synovitis, slight prepatellar bursitis and tiny popliteal cyst.\par 2. Mild quadriceps tendinopathy.\par 3. Tiny enchondroma in the superior anterior peripheral lateral femoral condyle.\par 4. No acute osseous injury, meniscal tear or loose body.\par E- signed by Vito Monk MD 10/18/2022

## 2023-07-06 NOTE — REASON FOR VISIT
[FreeTextEntry2] : Patient is here for a Worker's Comp Follow Up appointment for the Left Knee. DOI: 8/25/21\par

## 2023-08-16 ENCOUNTER — APPOINTMENT (OUTPATIENT)
Dept: ORTHOPEDIC SURGERY | Facility: CLINIC | Age: 43
End: 2023-08-16
Payer: OTHER MISCELLANEOUS

## 2023-08-16 VITALS — WEIGHT: 170 LBS | HEIGHT: 62 IN | BODY MASS INDEX: 31.28 KG/M2

## 2023-08-16 PROCEDURE — 99213 OFFICE O/P EST LOW 20 MIN: CPT

## 2023-08-16 NOTE — PHYSICAL EXAM
[NL (140)] : flexion 140 degrees [NL (0)] : extension 0 degrees [5___] : hamstring 5[unfilled]/5 [] : patient ambulates with assistive device [de-identified] : ARELIS

## 2023-08-16 NOTE — HISTORY OF PRESENT ILLNESS
[de-identified] : WC DOI 8/25/21 08/16/23: Follow up left knee. Symptoms continue. She has second opinion scheduled for next month. She has been going to PT.  7/5/223: Follow up left knee pain. Patient states condition is similar to last visit. She saw rheum, who recommended arthroscopy.   4/19/23 Patient presents in follow up, reporting pain scale 4, continues to have difficulty with bending.. Currently using bracing to left knee. Patient continues to report improvement with continued PT. Patient has consultation with Rheumatology next month.   3/8/23: Follow up left knee. Symptoms continue. Some relief with PT. Notes intermittent swelling. Rheum apt was changed due to them not accepting WC.  1/25/23: Left knee continues, but does not some improvement with PT. Wearing HKB. Has rheum apt for next month.  12-14-22- Continues out of work and in the hinged knee brace. No help from the csi 2 weeks ago. states difficulty with flexion and ambulation   12/01/2022: Here for follow up LEft knee. Notes only slight improvement. Cont Brace, NSAIDs and PT. Continues to c/o weakness. Remains OOW.  9/21/22: f/u left knee.   9/7/22: f/u on the left knee and calf, had doppler that returned negative. Is attending PT. Still some difficulty with tasks.   8/3/22: Left knee and lower leg pain worsening over the last month. She has been working 16 hr shifts 4 days per week which she think flared up her pain. She is using the knee brace. Meloxicam PRN   2/9/22: Here for fu. Pain has returned. She has been working.   9/17/21: PT present to review MRI.   [Work related] : work related [6] : 6 [5] : 5 [Dull/Aching] : dull/aching [Localized] : localized [Constant] : constant [Sleep] : sleep [Meds] : meds [Physical therapy] : physical therapy [Injection therapy] : injection therapy [Standing] : standing [Walking] : walking [Not working due to injury] : Work status: not working due to injury [] : yes [FreeTextEntry1] : left knee [FreeTextEntry3] : 8/25/21 [FreeTextEntry5] : Pt is here for follow up of LT knee. Pt states condition is similar to last visit. Using [facility]. Got her bloodwork results from Rheum. [FreeTextEntry9] : cortisone inj, brace [de-identified] : Mobic  [de-identified] : long periods

## 2023-08-16 NOTE — ASSESSMENT
[FreeTextEntry1] : outside xrays of the left knee UC from 8/26/21 reveals no fracture, or abnormalities.   OOW 1wk  Encouraged HEP and PT to improve mechanics and reduce pain.  OOW until 9/27/21 2/9/22: Recommend restarting in PT, Mobic rx. OOW x 1 week. Follow up 4 weeks.  8/3/22: Left knee and leg pain flareup with calf pain about a week ago. Send for stat doppler LLE to r/o DVT. If doppler negative, will start PT. Mobic. fu 4 weeks  9/7/22: anticipating she will be able to return to work in 2 wks, if not she will contact me for options.  Disability paperwork handed to office staff today.  Apply ice to affected area. resume PT and HEP to improve mechanics and reduce pain. Has been beneficial for the patient.  Questions addressed.  09/21/2022: Rtw full duty in 2 wks. resume PT and HEp as needed, Questions answered. Apply ice to affected area.  11/03/2022: Prescribed mdp to help alveated pain. confrims medial tenderness.  resume pt Renewed.  OOW until 12/1/22 12/1/22: CSI offered today - tolerated well.  PT renewed OOW until 12/15/22 Questions addressed. Activity modifier as tolerated.  12-14-22- pt renewed mg 2 and variance for that  continue out of work ambulate in the [facility] due to no responsive treatment without structural damage on the mri advise rheumatology consult f/u 6 weeks  1/25/23: Treatment options discussed. Will continue PT. Rx for Mobic.  OOW with [facility] brace. Followup 4-6 weeks after rheum consult.  3/8/23: Continue PT.  Will reschedule rheum consult.  OOW with [facility]. Return in 4-6 weeks.   4/19/23 42 year old female presents today for follow up.  Plan to continue Physical therapy, bracing.  Will have patient follow up in 4-6 weeks after Rheum evaluation.   05/31/2023 pending blood work with rhematologist.  will remain OOW work. RTO 4 wks or soon following result from rhematologist.  Intends to drop off disability PPwork in the future.  Questions addressed. Activity modifier as tolerated.  Light duty is not an option aval to her.   07/05/23: Continue PT.  Rheumatologist recommended an arthroscopy. I do not recommend going in due to lack of structural findings on MRI. She will get a second opinion.   Questions addressed. Activity modifier as tolerated.   08/16/23: Treatment options reviewed. Continue PT. She has consult scheduled. Return in 4-6 weeks.  Progress note completed by Desiree Gomez PA-C under the direct supervision of Jeff Osborn MD.

## 2023-08-16 NOTE — WORK
[Total (100%)] : total (100%) [Does not reveal pre-existing condition(s) that may affect treatment/prognosis] : does not reveal pre-existing condition(s) that may affect treatment/prognosis [Patient] : patient [No Rx restrictions] : No Rx restrictions. [FreeTextEntry1] : guarded The patient is currently out of work

## 2023-09-27 ENCOUNTER — APPOINTMENT (OUTPATIENT)
Dept: ORTHOPEDIC SURGERY | Facility: CLINIC | Age: 43
End: 2023-09-27
Payer: OTHER MISCELLANEOUS

## 2023-09-27 VITALS — BODY MASS INDEX: 31.65 KG/M2 | WEIGHT: 172 LBS | HEIGHT: 62 IN

## 2023-09-27 PROCEDURE — 99213 OFFICE O/P EST LOW 20 MIN: CPT

## 2023-11-01 ENCOUNTER — APPOINTMENT (OUTPATIENT)
Dept: ORTHOPEDIC SURGERY | Facility: CLINIC | Age: 43
End: 2023-11-01
Payer: OTHER MISCELLANEOUS

## 2023-11-01 ENCOUNTER — NON-APPOINTMENT (OUTPATIENT)
Age: 43
End: 2023-11-01

## 2023-11-01 VITALS — BODY MASS INDEX: 31.65 KG/M2 | WEIGHT: 172 LBS | HEIGHT: 62 IN

## 2023-11-01 PROCEDURE — 99213 OFFICE O/P EST LOW 20 MIN: CPT

## 2023-11-17 ENCOUNTER — APPOINTMENT (OUTPATIENT)
Dept: ORTHOPEDIC SURGERY | Facility: CLINIC | Age: 43
End: 2023-11-17
Payer: OTHER MISCELLANEOUS

## 2023-11-17 VITALS — BODY MASS INDEX: 31.65 KG/M2 | HEIGHT: 62 IN | WEIGHT: 172 LBS

## 2023-11-17 PROCEDURE — 99214 OFFICE O/P EST MOD 30 MIN: CPT

## 2023-11-17 PROCEDURE — 73564 X-RAY EXAM KNEE 4 OR MORE: CPT | Mod: LT

## 2023-12-13 ENCOUNTER — NON-APPOINTMENT (OUTPATIENT)
Age: 43
End: 2023-12-13

## 2023-12-13 ENCOUNTER — APPOINTMENT (OUTPATIENT)
Dept: ORTHOPEDIC SURGERY | Facility: CLINIC | Age: 43
End: 2023-12-13
Payer: OTHER MISCELLANEOUS

## 2023-12-13 VITALS — BODY MASS INDEX: 31.65 KG/M2 | WEIGHT: 172 LBS | HEIGHT: 62 IN

## 2023-12-13 PROCEDURE — 99213 OFFICE O/P EST LOW 20 MIN: CPT

## 2023-12-13 NOTE — HISTORY OF PRESENT ILLNESS
[Work related] : work related [Result of repetitive motion] : result of repetitive motion [6] : 6 [4] : 4 [Dull/Aching] : dull/aching [Localized] : localized [Constant] : constant [Sleep] : sleep [Meds] : meds [Physical therapy] : physical therapy [Injection therapy] : injection therapy [Standing] : standing [Walking] : walking [Not working due to injury] : Work status: not working due to injury [de-identified] : WC DOI 8/25/21 12/13/23: Follow up left knee. Was seen by Dr. Hoyt on 11/17/23 who ordered left knee x-rays. Symptoms persist. Is schedulef for pain management consult next week on 12/22/23.   11/01/2023: Follow up left knee. Symptoms persist. States she is awaiting "auth" to get a second opinion. Going to PT with relief.  09/27/2023: Follow up left knee. Her second opinion was cancelled. She continues PT and continues to improve.   08/16/23: Follow up left knee. Symptoms continue. She has second opinion scheduled for next month. She has been going to PT.  7/5/223: Follow up left knee pain. Patient states condition is similar to last visit. She saw rheum, who recommended arthroscopy.   4/19/23 Patient presents in follow up, reporting pain scale 4, continues to have difficulty with bending.. Currently using bracing to left knee. Patient continues to report improvement with continued PT. Patient has consultation with Rheumatology next month.   3/8/23: Follow up left knee. Symptoms continue. Some relief with PT. Notes intermittent swelling. Rheum apt was changed due to them not accepting WC.  1/25/23: Left knee continues, but does not some improvement with PT. Wearing HKB. Has rheum apt for next month.  12-14-22- Continues out of work and in the hinged knee brace. No help from the csi 2 weeks ago. states difficulty with flexion and ambulation  12/01/2022: Here for follow up LEft knee. Notes only slight improvement. Cont Brace, NSAIDs and PT. Continues to c/o weakness. Remains OOW.  9/21/22: f/u left knee.   9/7/22: f/u on the left knee and calf, had doppler that returned negative. Is attending PT. Still some difficulty with tasks.   8/3/22: Left knee and lower leg pain worsening over the last month. She has been working 16 hr shifts 4 days per week which she think flared up her pain. She is using the knee brace. Meloxicam PRN   2/9/22: Here for fu. Pain has returned. She has been working.   9/17/21: PT present to review MRI.   [] : Post Surgical Visit: no [FreeTextEntry1] : left knee [FreeTextEntry3] : 8/25/21 [FreeTextEntry5] : Pt is here for follow up of LT knee. Pt states condition is similar to last visit. Using HKB.  [FreeTextEntry9] : cortisone inj, brace [de-identified] : long periods [de-identified] : Mobic and PT

## 2023-12-13 NOTE — ASSESSMENT
[FreeTextEntry1] : outside xrays of the left knee UC from 8/26/21 reveals no fracture, or abnormalities.  OOW 1wk  Encouraged HEP and PT to improve mechanics and reduce pain. OOW until 9/27/21 2/9/22: Recommend restarting in PT, Mobic rx. OOW x 1 week. Follow up 4 weeks.  8/3/22: Left knee and leg pain flareup with calf pain about a week ago. Send for stat doppler LLE to r/o DVT. If doppler negative, will start PT. Mobic. fu 4 weeks  9/7/22: anticipating she will be able to return to work in 2 wks, if not she will contact me for options. Disability paperwork handed to office staff today. Apply ice to affected area. resume PT and HEP to improve mechanics and reduce pain. Has been beneficial for the patient. Questions addressed.  09/21/2022: Rtw full duty in 2 wks. resume PT and HEp as needed, Questions answered. Apply ice to affected area.  11/03/2022: Prescribed mdp to help alveated pain. confrims medial tenderness. resume pt Renewed. OOW until 12/1/22 12/1/22: CSI offered today - tolerated well. PT renewed OOW until 12/15/22 Questions addressed. Activity modifier as tolerated.  12-14-22- pt renewed mg 2 and variance for that continue out of work ambulate in the hkb due to no responsive treatment without structural damage on the mri advise rheumatology consult f/u 6 weeks  1/25/23: Treatment options discussed. Will continue PT. Rx for Mobic. OOW with HKB brace. Followup 4-6 weeks after rheum consult.  3/8/23: Continue PT. Will reschedule rheum consult. OOW with HKB. Return in 4-6 weeks.  4/19/23 42 year old female presents today for follow up.  Plan to continue Physical therapy, bracing. Will have patient follow up in 4-6 weeks after Rheum evaluation.  05/31/2023 pending blood work with rhematologist. will remain OOW work. RTO 4 wks or soon following result from rhematologist. Intends to drop off disability PPwork in the future. Questions addressed. Activity modifier as tolerated. Light duty is not an option aval to her.  07/05/23: Continue PT. Rheumatologist recommended an arthroscopy. I do not recommend going in due to lack of structural findings on MRI. She will get a second opinion. Questions addressed. Activity modifier as tolerated.  08/16/23: Treatment options reviewed. Continue PT. She has consult scheduled. Return in 4-6 weeks.  09/27/2023: Treatment options reviewed. She continues to have medial sided pain. Continue PT. Her 2nd opinion is scheduled. Follow up in 4-6 weeks.  11/01/2023: Continue PT. Recommend patient have second opinion.  Continue with brace, Patient is motivated to return to work, but no light duty is available. Return in 6 weeks. Questions answered.  12/13/2023: Treatment options reviewed. I reviewed the notes from her visit with Dr. Alfaro. She has a pain management appointment scheduled already. She has been making slow gains with PT. Patient would benefit from more PT. This is a formal request for 12-15 sessions of PT for increasing ROM, improving strength/mechanics, and decreasing pain.  Patient is motivated to return to work, but no light duty is available. Questions addressed.  Follow up in 6 weeks.  The documentation recorded by the scribe accurately reflects the service I personally performed and the decisions made by me. I, Miguel Ángel Saxena, attest that this documentation has been prepared under the direction and in the presence of Provider Jeff Osborn MD.  The patient was seen by Jeff Osborn MD.

## 2023-12-13 NOTE — DATA REVIEWED
[FreeTextEntry1] : Impression: left knee 1. Slight effusion, mild infrapatellar synovitis, slight prepatellar bursitis and tiny popliteal cyst. 2. Tiny enchondroma in the lateral femoral condyle. 3. No acute osseous injury, ligament tear, meniscal tear, chondral defect or loose body. 4. Mild distal quadriceps tendinopathy without tear. E-signed by Vito Monk MD 09/13/2021.  Impression: left knee 1. No significant interval change from prior exam, which includes slight effusion, infrapatellar synovitis, slight prepatellar bursitis and tiny popliteal cyst. 2. Mild quadriceps tendinopathy. 3. Tiny enchondroma in the superior anterior peripheral lateral femoral condyle. 4. No acute osseous injury, meniscal tear or loose body. E- signed by Vito Monk MD 10/18/2022

## 2023-12-13 NOTE — PHYSICAL EXAM
[NL (140)] : flexion 140 degrees [NL (0)] : extension 0 degrees [5___] : hamstring 5[unfilled]/5 [] : patient ambulates with assistive device [de-identified] : ARELIS

## 2023-12-18 ENCOUNTER — APPOINTMENT (OUTPATIENT)
Dept: PAIN MANAGEMENT | Facility: CLINIC | Age: 43
End: 2023-12-18
Payer: OTHER MISCELLANEOUS

## 2023-12-18 VITALS — BODY MASS INDEX: 31.28 KG/M2 | WEIGHT: 170 LBS | HEIGHT: 62 IN

## 2023-12-18 PROCEDURE — 99244 OFF/OP CNSLTJ NEW/EST MOD 40: CPT

## 2023-12-18 NOTE — HISTORY OF PRESENT ILLNESS
[7] : 7 [Dull/Aching] : dull/aching [Localized] : localized [Constant] : constant [Meds] : meds [Sitting] : sitting [Standing] : standing [Walking] : walking [Bending forward] : bending forward [Stairs] : stairs [] : yes [FreeTextEntry1] : left knee  [FreeTextEntry6] : stiffness  [FreeTextEntry9] : meloxicam

## 2023-12-22 ENCOUNTER — APPOINTMENT (OUTPATIENT)
Dept: PAIN MANAGEMENT | Facility: CLINIC | Age: 43
End: 2023-12-22

## 2024-01-24 ENCOUNTER — APPOINTMENT (OUTPATIENT)
Dept: ORTHOPEDIC SURGERY | Facility: CLINIC | Age: 44
End: 2024-01-24
Payer: OTHER MISCELLANEOUS

## 2024-01-24 ENCOUNTER — NON-APPOINTMENT (OUTPATIENT)
Age: 44
End: 2024-01-24

## 2024-01-24 VITALS — BODY MASS INDEX: 31.28 KG/M2 | HEIGHT: 62 IN | WEIGHT: 170 LBS

## 2024-01-24 PROCEDURE — 99213 OFFICE O/P EST LOW 20 MIN: CPT

## 2024-01-24 NOTE — HISTORY OF PRESENT ILLNESS
[Work related] : work related [Result of repetitive motion] : result of repetitive motion [6] : 6 [4] : 4 [Dull/Aching] : dull/aching [Localized] : localized [Constant] : constant [Sleep] : sleep [Meds] : meds [Physical therapy] : physical therapy [Injection therapy] : injection therapy [Standing] : standing [Walking] : walking [Not working due to injury] : Work status: not working due to injury [de-identified] : WC DOI 8/25/21 01/24/2024: Follow up left knee. She had consult with Dr. Morel, who recommended nerve block, Now awaiting auth. She has been going to PT.  12/13/23: Follow up left knee. Was seen by Dr. Hoyt on 11/17/23 who ordered left knee x-rays. Symptoms persist. Is schedulef for pain management consult next week on 12/22/23.   11/01/2023: Follow up left knee. Symptoms persist. States she is awaiting "auth" to get a second opinion. Going to PT with relief.  09/27/2023: Follow up left knee. Her second opinion was cancelled. She continues PT and continues to improve.   08/16/23: Follow up left knee. Symptoms continue. She has second opinion scheduled for next month. She has been going to PT.  7/5/223: Follow up left knee pain. Patient states condition is similar to last visit. She saw rheum, who recommended arthroscopy.   4/19/23 Patient presents in follow up, reporting pain scale 4, continues to have difficulty with bending.. Currently using bracing to left knee. Patient continues to report improvement with continued PT. Patient has consultation with Rheumatology next month.   3/8/23: Follow up left knee. Symptoms continue. Some relief with PT. Notes intermittent swelling. Rheum apt was changed due to them not accepting WC.  1/25/23: Left knee continues, but does not some improvement with PT. Wearing HKB. Has rheum apt for next month.  12-14-22- Continues out of work and in the hinged knee brace. No help from the csi 2 weeks ago. states difficulty with flexion and ambulation  12/01/2022: Here for follow up LEft knee. Notes only slight improvement. Cont Brace, NSAIDs and PT. Continues to c/o weakness. Remains OOW.  9/21/22: f/u left knee.   9/7/22: f/u on the left knee and calf, had doppler that returned negative. Is attending PT. Still some difficulty with tasks.   8/3/22: Left knee and lower leg pain worsening over the last month. She has been working 16 hr shifts 4 days per week which she think flared up her pain. She is using the knee brace. Meloxicam PRN   2/9/22: Here for fu. Pain has returned. She has been working.   9/17/21: PT present to review MRI.   [] : Post Surgical Visit: no [FreeTextEntry3] : 8/25/21 [FreeTextEntry1] : left knee [FreeTextEntry5] : Pt is here for follow up of LT knee. Pt states condition is similar to last visit. Using HKB.  [FreeTextEntry9] : cortisone inj, brace [de-identified] : long periods [de-identified] : Mobic and PT  [de-identified] : Mental Therapy

## 2024-01-24 NOTE — PHYSICAL EXAM
[NL (140)] : flexion 140 degrees [NL (0)] : extension 0 degrees [5___] : hamstring 5[unfilled]/5 [] : patient ambulates with assistive device [de-identified] : ARELIS

## 2024-01-24 NOTE — ASSESSMENT
[FreeTextEntry1] : outside xrays of the left knee UC from 8/26/21 reveals no fracture, or abnormalities.  OOW 1wk  Encouraged HEP and PT to improve mechanics and reduce pain. OOW until 9/27/21 2/9/22: Recommend restarting in PT, Mobic rx. OOW x 1 week. Follow up 4 weeks.  8/3/22: Left knee and leg pain flareup with calf pain about a week ago. Send for stat doppler LLE to r/o DVT. If doppler negative, will start PT. Mobic. fu 4 weeks  9/7/22: anticipating she will be able to return to work in 2 wks, if not she will contact me for options. Disability paperwork handed to office staff today. Apply ice to affected area. resume PT and HEP to improve mechanics and reduce pain. Has been beneficial for the patient. Questions addressed.  09/21/2022: Rtw full duty in 2 wks. resume PT and HEp as needed, Questions answered. Apply ice to affected area.  11/03/2022: Prescribed mdp to help alveated pain. confrims medial tenderness. resume pt Renewed. OOW until 12/1/22 12/1/22: CSI offered today - tolerated well. PT renewed OOW until 12/15/22 Questions addressed. Activity modifier as tolerated.  12-14-22- pt renewed mg 2 and variance for that continue out of work ambulate in the hkb due to no responsive treatment without structural damage on the mri advise rheumatology consult f/u 6 weeks  1/25/23: Treatment options discussed. Will continue PT. Rx for Mobic. OOW with HKB brace. Followup 4-6 weeks after rheum consult.  3/8/23: Continue PT. Will reschedule rheum consult. OOW with HKB. Return in 4-6 weeks.  4/19/23 42 year old female presents today for follow up.  Plan to continue Physical therapy, bracing. Will have patient follow up in 4-6 weeks after Rheum evaluation.  05/31/2023 pending blood work with rhematologist. will remain OOW work. RTO 4 wks or soon following result from rhematologist. Intends to drop off disability PPwork in the future. Questions addressed. Activity modifier as tolerated. Light duty is not an option aval to her.  07/05/23: Continue PT. Rheumatologist recommended an arthroscopy. I do not recommend going in due to lack of structural findings on MRI. She will get a second opinion. Questions addressed. Activity modifier as tolerated.  08/16/23: Treatment options reviewed. Continue PT. She has consult scheduled. Return in 4-6 weeks.  09/27/2023: Treatment options reviewed. She continues to have medial sided pain. Continue PT. Her 2nd opinion is scheduled. Follow up in 4-6 weeks.  11/01/2023: Continue PT. Recommend patient have second opinion.  Continue with brace, Patient is motivated to return to work, but no light duty is available. Return in 6 weeks. Questions answered.  12/13/2023: Treatment options reviewed. I reviewed the notes from her visit with Dr. Alfaro. She has a pain management appointment scheduled already. She has been making slow gains with PT. Patient would benefit from more PT. This is a formal request for 12-15 sessions of PT for increasing ROM, improving strength/mechanics, and decreasing pain.  Patient is motivated to return to work, but no light duty is available. Questions addressed.  Follow up in 6 weeks.  01/24/2024: Patient symptoms persist. Now awaiting auth for genicular block via Dr. Morel. Recommend continued PT for strengthening and mechanics. Follow up in 6 weeks. Questions answered.  Progress note completed by Desiree Gomez PA-C under the direct supervision of Jeff Osborn MD.

## 2024-03-06 ENCOUNTER — NON-APPOINTMENT (OUTPATIENT)
Age: 44
End: 2024-03-06

## 2024-03-06 ENCOUNTER — APPOINTMENT (OUTPATIENT)
Dept: ORTHOPEDIC SURGERY | Facility: CLINIC | Age: 44
End: 2024-03-06
Payer: OTHER MISCELLANEOUS

## 2024-03-06 VITALS — HEIGHT: 62 IN | WEIGHT: 170 LBS | BODY MASS INDEX: 31.28 KG/M2

## 2024-03-06 PROCEDURE — 99213 OFFICE O/P EST LOW 20 MIN: CPT

## 2024-03-06 NOTE — ASSESSMENT
[FreeTextEntry1] : outside xrays of the left knee UC from 8/26/21 reveals no fracture, or abnormalities.  OOW 1wk  Encouraged HEP and PT to improve mechanics and reduce pain. OOW until 9/27/21 2/9/22: Recommend restarting in PT, Mobic rx. OOW x 1 week. Follow up 4 weeks.  8/3/22: Left knee and leg pain flareup with calf pain about a week ago. Send for stat doppler LLE to r/o DVT. If doppler negative, will start PT. Mobic. fu 4 weeks  9/7/22: anticipating she will be able to return to work in 2 wks, if not she will contact me for options. Disability paperwork handed to office staff today. Apply ice to affected area. resume PT and HEP to improve mechanics and reduce pain. Has been beneficial for the patient. Questions addressed.  09/21/2022: Rtw full duty in 2 wks. resume PT and HEp as needed, Questions answered. Apply ice to affected area.  11/03/2022: Prescribed mdp to help alveated pain. confrims medial tenderness. resume pt Renewed. OOW until 12/1/22 12/1/22: CSI offered today - tolerated well. PT renewed OOW until 12/15/22 Questions addressed. Activity modifier as tolerated.  12-14-22- pt renewed mg 2 and variance for that continue out of work ambulate in the hkb due to no responsive treatment without structural damage on the mri advise rheumatology consult f/u 6 weeks  1/25/23: Treatment options discussed. Will continue PT. Rx for Mobic. OOW with HKB brace. Followup 4-6 weeks after rheum consult.  3/8/23: Continue PT. Will reschedule rheum consult. OOW with HKB. Return in 4-6 weeks.  4/19/23 42 year old female presents today for follow up.  Plan to continue Physical therapy, bracing. Will have patient follow up in 4-6 weeks after Rheum evaluation.  05/31/2023 pending blood work with rhematologist. will remain OOW work. RTO 4 wks or soon following result from rhematologist. Intends to drop off disability PPwork in the future. Questions addressed. Activity modifier as tolerated. Light duty is not an option aval to her.  07/05/23: Continue PT. Rheumatologist recommended an arthroscopy. I do not recommend going in due to lack of structural findings on MRI. She will get a second opinion. Questions addressed. Activity modifier as tolerated.  08/16/23: Treatment options reviewed. Continue PT. She has consult scheduled. Return in 4-6 weeks.  09/27/2023: Treatment options reviewed. She continues to have medial sided pain. Continue PT. Her 2nd opinion is scheduled. Follow up in 4-6 weeks.  11/01/2023: Continue PT. Recommend patient have second opinion.  Continue with brace, Patient is motivated to return to work, but no light duty is available. Return in 6 weeks. Questions answered.  12/13/2023: Treatment options reviewed. I reviewed the notes from her visit with Dr. Alfaro. She has a pain management appointment scheduled already. She has been making slow gains with PT. Patient would benefit from more PT. This is a formal request for 12-15 sessions of PT for increasing ROM, improving strength/mechanics, and decreasing pain.  Patient is motivated to return to work, but no light duty is available. Questions addressed.  Follow up in 6 weeks.  01/24/2024: Patient symptoms persist. Now awaiting auth for genicular block via Dr. Morel. Recommend continued PT for strengthening and mechanics. Follow up in 6 weeks. Questions answered.  03/06/2024: Symptoms persist. She has continued stiffness and pain.  We will obtain an updated MRI to evaluate. Patient is motivated to return to work, but no light duty is available. Activity modification as tolerated.  Questions addressed.   The documentation recorded by the scribe accurately reflects the service I personally performed and the decisions made by me. I, Miguel Ángel Saxena, attest that this documentation has been prepared under the direction and in the presence of Provider Jeff Osborn MD.  The patient was seen by Jeff Osborn MD.

## 2024-03-06 NOTE — PHYSICAL EXAM
[NL (140)] : flexion 140 degrees [NL (0)] : extension 0 degrees [5___] : hamstring 5[unfilled]/5 [] : patient ambulates with assistive device [de-identified] : ARELIS

## 2024-03-06 NOTE — HISTORY OF PRESENT ILLNESS
[Work related] : work related [Result of repetitive motion] : result of repetitive motion [4] : 4 [6] : 6 [Dull/Aching] : dull/aching [Localized] : localized [Constant] : constant [Sleep] : sleep [Meds] : meds [Physical therapy] : physical therapy [Injection therapy] : injection therapy [Walking] : walking [Standing] : standing [Not working due to injury] : Work status: not working due to injury [de-identified] : WC DOI 8/25/21 3/6/2024: Follow up left knee. Symptoms persist. She is waiting for approval for nerve block.   01/24/2024: Follow up left knee. She had consult with Dr. Morel, who recommended nerve block, Now awaiting auth. She has been going to PT.  12/13/23: Follow up left knee. Was seen by Dr. Hoyt on 11/17/23 who ordered left knee x-rays. Symptoms persist. Is schedulef for pain management consult next week on 12/22/23.   11/01/2023: Follow up left knee. Symptoms persist. States she is awaiting "auth" to get a second opinion. Going to PT with relief.  09/27/2023: Follow up left knee. Her second opinion was cancelled. She continues PT and continues to improve.   08/16/23: Follow up left knee. Symptoms continue. She has second opinion scheduled for next month. She has been going to PT.  7/5/223: Follow up left knee pain. Patient states condition is similar to last visit. She saw rheum, who recommended arthroscopy.   4/19/23 Patient presents in follow up, reporting pain scale 4, continues to have difficulty with bending.. Currently using bracing to left knee. Patient continues to report improvement with continued PT. Patient has consultation with Rheumatology next month.   3/8/23: Follow up left knee. Symptoms continue. Some relief with PT. Notes intermittent swelling. Rheum apt was changed due to them not accepting WC.  1/25/23: Left knee continues, but does not some improvement with PT. Wearing HKB. Has rheum apt for next month.  12-14-22- Continues out of work and in the hinged knee brace. No help from the csi 2 weeks ago. states difficulty with flexion and ambulation  12/01/2022: Here for follow up LEft knee. Notes only slight improvement. Cont Brace, NSAIDs and PT. Continues to c/o weakness. Remains OOW.  9/21/22: f/u left knee.   9/7/22: f/u on the left knee and calf, had doppler that returned negative. Is attending PT. Still some difficulty with tasks.   8/3/22: Left knee and lower leg pain worsening over the last month. She has been working 16 hr shifts 4 days per week which she think flared up her pain. She is using the knee brace. Meloxicam PRN   2/9/22: Here for fu. Pain has returned. She has been working.   9/17/21: PT present to review MRI.   [] : Post Surgical Visit: no [FreeTextEntry1] : left knee [FreeTextEntry3] : 8/25/21 [FreeTextEntry9] : cortisone inj, brace [FreeTextEntry5] : Pt is here for follow up of LT knee. Pt states condition is similar to last visit. Using HKB.  [de-identified] : long periods [de-identified] : Mobic and PT  [de-identified] : Mental Therapy

## 2024-04-01 ENCOUNTER — NON-APPOINTMENT (OUTPATIENT)
Age: 44
End: 2024-04-01

## 2024-04-17 ENCOUNTER — APPOINTMENT (OUTPATIENT)
Dept: ORTHOPEDIC SURGERY | Facility: CLINIC | Age: 44
End: 2024-04-17
Payer: OTHER MISCELLANEOUS

## 2024-04-17 ENCOUNTER — NON-APPOINTMENT (OUTPATIENT)
Age: 44
End: 2024-04-17

## 2024-04-17 VITALS — WEIGHT: 170 LBS | HEIGHT: 62 IN | BODY MASS INDEX: 31.28 KG/M2

## 2024-04-17 PROCEDURE — 99455 WORK RELATED DISABILITY EXAM: CPT

## 2024-04-17 NOTE — WORK
[Total (100%)] : total (100%) [Does not reveal pre-existing condition(s) that may affect treatment/prognosis] : does not reveal pre-existing condition(s) that may affect treatment/prognosis [Patient] : patient [No Rx restrictions] : No Rx restrictions. [FreeTextEntry1] : guarded The patient is currently out of work [Has the patient reached Maximum Medical Improvement? If yes, indicate date___] : Yes, on [unfilled] [Left] : left [Right] : right [Yes] : Yes [FreeTextEntry7] : right knee [FreeTextEntry8] :  [de-identified] : 0-120 [FreeTextEntry5] : 10 [de-identified] : full extention passivlry.  severe pain with 0 degrees to 120 degrees. rom were taken 3 times on the left once on rt knee with goniometer

## 2024-04-17 NOTE — PHYSICAL EXAM
[NL (140)] : flexion 140 degrees [NL (0)] : extension 0 degrees [5___] : hamstring 5[unfilled]/5 [] : patient ambulates with assistive device [de-identified] : ARELIS

## 2024-04-17 NOTE — HISTORY OF PRESENT ILLNESS
[Work related] : work related [Result of repetitive motion] : result of repetitive motion [6] : 6 [4] : 4 [Dull/Aching] : dull/aching [Localized] : localized [Constant] : constant [Sleep] : sleep [Meds] : meds [Physical therapy] : physical therapy [Injection therapy] : injection therapy [Standing] : standing [Walking] : walking [Not working due to injury] : Work status: not working due to injury [de-identified] : WC DOI 8/25/21 04/17/2024: Follow up left knee. Symptoms continue. She presents today for [facility].  03/6/2024: Follow up left knee. Symptoms persist. She is waiting for approval for nerve block.   01/24/2024: Follow up left knee. She had consult with Dr. Morel, who recommended nerve block, Now awaiting auth. She has been going to PT.  12/13/23: Follow up left knee. Was seen by Dr. Hoyt on 11/17/23 who ordered left knee x-rays. Symptoms persist. Is schedulef for pain management consult next week on 12/22/23.   11/01/2023: Follow up left knee. Symptoms persist. States she is awaiting "auth" to get a second opinion. Going to PT with relief.  09/27/2023: Follow up left knee. Her second opinion was cancelled. She continues PT and continues to improve.   08/16/23: Follow up left knee. Symptoms continue. She has second opinion scheduled for next month. She has been going to PT.  7/5/223: Follow up left knee pain. Patient states condition is similar to last visit. She saw rheum, who recommended arthroscopy.   4/19/23 Patient presents in follow up, reporting pain scale 4, continues to have difficulty with bending.. Currently using bracing to left knee. Patient continues to report improvement with continued PT. Patient has consultation with Rheumatology next month.   3/8/23: Follow up left knee. Symptoms continue. Some relief with PT. Notes intermittent swelling. Rheum apt was changed due to them not accepting WC.  1/25/23: Left knee continues, but does not some improvement with PT. Wearing HKB. Has rheum apt for next month.  12-14-22- Continues out of work and in the hinged knee brace. No help from the csi 2 weeks ago. states difficulty with flexion and ambulation  12/01/2022: Here for follow up LEft knee. Notes only slight improvement. Cont Brace, NSAIDs and PT. Continues to c/o weakness. Remains OOW.  9/21/22: f/u left knee.   9/7/22: f/u on the left knee and calf, had doppler that returned negative. Is attending PT. Still some difficulty with tasks.   8/3/22: Left knee and lower leg pain worsening over the last month. She has been working 16 hr shifts 4 days per week which she think flared up her pain. She is using the knee brace. Meloxicam PRN   2/9/22: Here for fu. Pain has returned. She has been working.   9/17/21: PT present to review MRI.   [] : Post Surgical Visit: no [FreeTextEntry1] : left knee [FreeTextEntry3] : 8/25/21 [FreeTextEntry5] : Pt is here for follow up of LT knee. Pt states condition is similar to last visit. Using HKB.  [FreeTextEntry9] : cortisone inj, brace [de-identified] : long periods [de-identified] : Mobic and Home exs [de-identified] : Mental Therapy

## 2024-05-04 ENCOUNTER — NON-APPOINTMENT (OUTPATIENT)
Age: 44
End: 2024-05-04

## 2024-05-29 ENCOUNTER — NON-APPOINTMENT (OUTPATIENT)
Age: 44
End: 2024-05-29

## 2024-05-29 ENCOUNTER — APPOINTMENT (OUTPATIENT)
Dept: ORTHOPEDIC SURGERY | Facility: CLINIC | Age: 44
End: 2024-05-29
Payer: OTHER MISCELLANEOUS

## 2024-05-29 VITALS — HEIGHT: 62 IN | WEIGHT: 170 LBS | BODY MASS INDEX: 31.28 KG/M2

## 2024-05-29 DIAGNOSIS — M25.462 EFFUSION, LEFT KNEE: ICD-10-CM

## 2024-05-29 DIAGNOSIS — M79.662 PAIN IN LEFT LOWER LEG: ICD-10-CM

## 2024-05-29 DIAGNOSIS — M25.362 OTHER INSTABILITY, LEFT KNEE: ICD-10-CM

## 2024-05-29 PROCEDURE — 99213 OFFICE O/P EST LOW 20 MIN: CPT

## 2024-05-29 NOTE — PHYSICAL EXAM
[NL (140)] : flexion 140 degrees [NL (0)] : extension 0 degrees [5___] : hamstring 5[unfilled]/5 [] : patient ambulates with assistive device [de-identified] : ARELIS

## 2024-05-29 NOTE — ASSESSMENT
[FreeTextEntry1] : outside xrays of the left knee UC from 8/26/21 reveals no fracture, or abnormalities.  OOW 1wk  Encouraged HEP and PT to improve mechanics and reduce pain. OOW until 9/27/21 2/9/22: Recommend restarting in PT, Mobic rx. OOW x 1 week. Follow up 4 weeks.  8/3/22: Left knee and leg pain flareup with calf pain about a week ago. Send for stat doppler LLE to r/o DVT. If doppler negative, will start PT. Mobic. fu 4 weeks  9/7/22: anticipating she will be able to return to work in 2 wks, if not she will contact me for options. Disability paperwork handed to office staff today. Apply ice to affected area. resume PT and HEP to improve mechanics and reduce pain. Has been beneficial for the patient. Questions addressed.  09/21/2022: Rtw full duty in 2 wks. resume PT and HEp as needed, Questions answered. Apply ice to affected area.  11/03/2022: Prescribed mdp to help alveated pain. confrims medial tenderness. resume pt Renewed. OOW until 12/1/22 12/1/22: CSI offered today - tolerated well. PT renewed OOW until 12/15/22 Questions addressed. Activity modifier as tolerated.  12-14-22- pt renewed mg 2 and variance for that continue out of work ambulate in the hkb due to no responsive treatment without structural damage on the mri advise rheumatology consult f/u 6 weeks  1/25/23: Treatment options discussed. Will continue PT. Rx for Mobic. OOW with HKB brace. Followup 4-6 weeks after rheum consult.  3/8/23: Continue PT. Will reschedule rheum consult. OOW with HKB. Return in 4-6 weeks.  4/19/23 42 year old female presents today for follow up.  Plan to continue Physical therapy, bracing. Will have patient follow up in 4-6 weeks after Rheum evaluation.  05/31/2023 pending blood work with rhematologist. will remain OOW work. RTO 4 wks or soon following result from rhematologist. Intends to drop off disability PPwork in the future. Questions addressed. Activity modifier as tolerated. Light duty is not an option aval to her.  07/05/23: Continue PT. Rheumatologist recommended an arthroscopy. I do not recommend going in due to lack of structural findings on MRI. She will get a second opinion. Questions addressed. Activity modifier as tolerated.  08/16/23: Treatment options reviewed. Continue PT. She has consult scheduled. Return in 4-6 weeks.  09/27/2023: Treatment options reviewed. She continues to have medial sided pain. Continue PT. Her 2nd opinion is scheduled. Follow up in 4-6 weeks.  11/01/2023: Continue PT. Recommend patient have second opinion.  Continue with brace, Patient is motivated to return to work, but no light duty is available. Return in 6 weeks. Questions answered.  12/13/2023: Treatment options reviewed. I reviewed the notes from her visit with Dr. Alfaro. She has a pain management appointment scheduled already. She has been making slow gains with PT. Patient would benefit from more PT. This is a formal request for 12-15 sessions of PT for increasing ROM, improving strength/mechanics, and decreasing pain.  Patient is motivated to return to work, but no light duty is available. Questions addressed.  Follow up in 6 weeks.  01/24/2024: Patient symptoms persist. Now awaiting auth for genicular block via Dr. Morel. Recommend continued PT for strengthening and mechanics. Follow up in 6 weeks. Questions answered.  03/06/2024: Symptoms persist. She has continued stiffness and pain.  We will obtain an updated MRI to evaluate. Patient is motivated to return to work, but no light duty is available. Activity modification as tolerated.  Questions addressed.   04/17/2024: Symptoms continue. Continue HEP. [facility] performed. Return PRN. Questions answered.  05/29/2024: She does not feel any better since her last visit. Persistent pain. Obtain MRI for further eval.   The documentation recorded by the scribe accurately reflects the service I personally performed and the decisions made by me. I, Miguel Ángel Saez, attest that this documentation has been prepared under the direction and in the presence of Provider Jeff Osborn MD.   The patient was seen by Jeff Osborn MD.

## 2024-05-29 NOTE — HISTORY OF PRESENT ILLNESS
[Work related] : work related [Result of repetitive motion] : result of repetitive motion [6] : 6 [4] : 4 [Dull/Aching] : dull/aching [Localized] : localized [Constant] : constant [Sleep] : sleep [Meds] : meds [Physical therapy] : physical therapy [Injection therapy] : injection therapy [Standing] : standing [Walking] : walking [Not working due to injury] : Work status: not working due to injury [de-identified] : WC DOI 8/25/21 05/29/2024: Follow up left knee. Symptoms continue.   04/17/2024: Follow up left knee. Symptoms continue. She presents today for [facility].  03/6/2024: Follow up left knee. Symptoms persist. She is waiting for approval for nerve block.   01/24/2024: Follow up left knee. She had consult with Dr. Morel, who recommended nerve block, Now awaiting auth. She has been going to PT.  12/13/23: Follow up left knee. Was seen by Dr. Hoyt on 11/17/23 who ordered left knee x-rays. Symptoms persist. Is schedulef for pain management consult next week on 12/22/23.   11/01/2023: Follow up left knee. Symptoms persist. States she is awaiting "auth" to get a second opinion. Going to PT with relief.  09/27/2023: Follow up left knee. Her second opinion was cancelled. She continues PT and continues to improve.   08/16/23: Follow up left knee. Symptoms continue. She has second opinion scheduled for next month. She has been going to PT.  7/5/223: Follow up left knee pain. Patient states condition is similar to last visit. She saw rheum, who recommended arthroscopy.   4/19/23 Patient presents in follow up, reporting pain scale 4, continues to have difficulty with bending.. Currently using bracing to left knee. Patient continues to report improvement with continued PT. Patient has consultation with Rheumatology next month.   3/8/23: Follow up left knee. Symptoms continue. Some relief with PT. Notes intermittent swelling. Rheum apt was changed due to them not accepting WC.  1/25/23: Left knee continues, but does not some improvement with PT. Wearing HKB. Has rheum apt for next month.  12-14-22- Continues out of work and in the hinged knee brace. No help from the csi 2 weeks ago. states difficulty with flexion and ambulation  12/01/2022: Here for follow up LEft knee. Notes only slight improvement. Cont Brace, NSAIDs and PT. Continues to c/o weakness. Remains OOW.  9/21/22: f/u left knee.   9/7/22: f/u on the left knee and calf, had doppler that returned negative. Is attending PT. Still some difficulty with tasks.   8/3/22: Left knee and lower leg pain worsening over the last month. She has been working 16 hr shifts 4 days per week which she think flared up her pain. She is using the knee brace. Meloxicam PRN   2/9/22: Here for fu. Pain has returned. She has been working.   9/17/21: PT present to review MRI.   [] : Post Surgical Visit: no [FreeTextEntry1] : left knee [FreeTextEntry3] : 8/25/21 [FreeTextEntry5] : Pt is here for follow up of LT knee. Pt states condition is similar to last visit. Using HKB.  [FreeTextEntry9] : cortisone inj, brace [de-identified] : long periods [de-identified] : Mobic and Home exs [de-identified] : Mental Therapy

## 2024-05-29 NOTE — WORK
[Total (100%)] : total (100%) [Does not reveal pre-existing condition(s) that may affect treatment/prognosis] : does not reveal pre-existing condition(s) that may affect treatment/prognosis [Patient] : patient [No Rx restrictions] : No Rx restrictions. [Has the patient reached Maximum Medical Improvement? If yes, indicate date___] : Yes, on [unfilled] [Left] : left [Right] : right [Yes] : Yes [FreeTextEntry1] : guarded The patient is currently out of work [FreeTextEntry7] : right knee [FreeTextEntry8] :  [de-identified] : 0-120 [FreeTextEntry5] : 10 [de-identified] : full extention passivlry.  severe pain with 0 degrees to 120 degrees. rom were taken 3 times on the left once on rt knee with goniometer

## 2024-05-29 NOTE — REASON FOR VISIT
Anesthesia Type: 1% lidocaine with epinephrine [Family Member] : family member [FreeTextEntry2] : Patient is here for a Worker's Comp Follow Up appointment for the Left Knee. DOI: 8/25/21

## 2024-06-26 ENCOUNTER — APPOINTMENT (OUTPATIENT)
Dept: ORTHOPEDIC SURGERY | Facility: CLINIC | Age: 44
End: 2024-06-26
Payer: OTHER MISCELLANEOUS

## 2024-06-26 VITALS — BODY MASS INDEX: 31.28 KG/M2 | HEIGHT: 62 IN | WEIGHT: 170 LBS

## 2024-06-26 DIAGNOSIS — S80.02XD CONTUSION OF LEFT KNEE, SUBSEQUENT ENCOUNTER: ICD-10-CM

## 2024-06-26 PROCEDURE — 99212 OFFICE O/P EST SF 10 MIN: CPT

## 2025-04-02 ENCOUNTER — NON-APPOINTMENT (OUTPATIENT)
Age: 45
End: 2025-04-02

## 2025-09-18 ENCOUNTER — NON-APPOINTMENT (OUTPATIENT)
Age: 45
End: 2025-09-18

## 2025-09-18 ENCOUNTER — APPOINTMENT (OUTPATIENT)
Dept: ORTHOPEDIC SURGERY | Facility: CLINIC | Age: 45
End: 2025-09-18
Payer: COMMERCIAL

## 2025-09-18 VITALS — BODY MASS INDEX: 31.28 KG/M2 | HEIGHT: 62 IN | WEIGHT: 170 LBS

## 2025-09-18 DIAGNOSIS — M75.01 ADHESIVE CAPSULITIS OF RIGHT SHOULDER: ICD-10-CM

## 2025-09-18 PROCEDURE — 99204 OFFICE O/P NEW MOD 45 MIN: CPT

## 2025-09-18 RX ORDER — MELOXICAM 15 MG/1
15 TABLET ORAL
Qty: 30 | Refills: 1 | Status: ACTIVE | COMMUNITY
Start: 2025-09-18 | End: 1900-01-01

## 2025-09-19 ENCOUNTER — APPOINTMENT (OUTPATIENT)
Dept: ORTHOPEDIC SURGERY | Facility: CLINIC | Age: 45
End: 2025-09-19

## 2025-09-25 PROBLEM — M75.51 SUBACROMIAL BURSITIS OF RIGHT SHOULDER JOINT: Status: ACTIVE | Noted: 2025-09-25
